# Patient Record
Sex: FEMALE | Race: WHITE
[De-identification: names, ages, dates, MRNs, and addresses within clinical notes are randomized per-mention and may not be internally consistent; named-entity substitution may affect disease eponyms.]

---

## 2017-03-04 PROBLEM — Z00.00 ENCOUNTER FOR PREVENTIVE HEALTH EXAMINATION: Status: ACTIVE | Noted: 2017-03-04

## 2017-03-27 ENCOUNTER — APPOINTMENT (OUTPATIENT)
Dept: UROLOGY | Facility: CLINIC | Age: 57
End: 2017-03-27

## 2018-02-01 ENCOUNTER — APPOINTMENT (OUTPATIENT)
Dept: PEDIATRIC ORTHOPEDIC SURGERY | Facility: CLINIC | Age: 58
End: 2018-02-01

## 2018-03-26 ENCOUNTER — APPOINTMENT (OUTPATIENT)
Dept: UROLOGY | Facility: CLINIC | Age: 58
End: 2018-03-26

## 2019-07-08 ENCOUNTER — APPOINTMENT (OUTPATIENT)
Dept: UROLOGY | Facility: CLINIC | Age: 59
End: 2019-07-08

## 2021-09-17 ENCOUNTER — OUTPATIENT (OUTPATIENT)
Dept: OUTPATIENT SERVICES | Facility: HOSPITAL | Age: 61
LOS: 1 days | Discharge: HOME | End: 2021-09-17
Payer: COMMERCIAL

## 2021-09-17 DIAGNOSIS — Z12.31 ENCOUNTER FOR SCREENING MAMMOGRAM FOR MALIGNANT NEOPLASM OF BREAST: ICD-10-CM

## 2021-09-17 DIAGNOSIS — R92.2 INCONCLUSIVE MAMMOGRAM: ICD-10-CM

## 2021-09-17 PROCEDURE — 77067 SCR MAMMO BI INCL CAD: CPT | Mod: 26

## 2021-09-17 PROCEDURE — 76641 ULTRASOUND BREAST COMPLETE: CPT | Mod: 26,50

## 2021-09-17 PROCEDURE — 77063 BREAST TOMOSYNTHESIS BI: CPT | Mod: 26

## 2021-10-20 ENCOUNTER — OUTPATIENT (OUTPATIENT)
Dept: OUTPATIENT SERVICES | Facility: HOSPITAL | Age: 61
LOS: 1 days | Discharge: HOME | End: 2021-10-20
Payer: COMMERCIAL

## 2021-10-20 DIAGNOSIS — M51.26 OTHER INTERVERTEBRAL DISC DISPLACEMENT, LUMBAR REGION: ICD-10-CM

## 2021-10-20 PROCEDURE — 72148 MRI LUMBAR SPINE W/O DYE: CPT | Mod: 26

## 2022-09-29 ENCOUNTER — OUTPATIENT (OUTPATIENT)
Dept: OUTPATIENT SERVICES | Facility: HOSPITAL | Age: 62
LOS: 1 days | Discharge: HOME | End: 2022-09-29

## 2022-09-29 DIAGNOSIS — R92.2 INCONCLUSIVE MAMMOGRAM: ICD-10-CM

## 2022-09-29 DIAGNOSIS — Z80.3 FAMILY HISTORY OF MALIGNANT NEOPLASM OF BREAST: ICD-10-CM

## 2022-09-29 DIAGNOSIS — Z12.31 ENCOUNTER FOR SCREENING MAMMOGRAM FOR MALIGNANT NEOPLASM OF BREAST: ICD-10-CM

## 2022-09-29 PROCEDURE — 77063 BREAST TOMOSYNTHESIS BI: CPT | Mod: 26

## 2022-09-29 PROCEDURE — 77067 SCR MAMMO BI INCL CAD: CPT | Mod: 26

## 2022-09-29 PROCEDURE — 76641 ULTRASOUND BREAST COMPLETE: CPT | Mod: 26,50

## 2022-10-04 ENCOUNTER — OUTPATIENT (OUTPATIENT)
Dept: OUTPATIENT SERVICES | Facility: HOSPITAL | Age: 62
LOS: 1 days | Discharge: HOME | End: 2022-10-04

## 2022-10-04 DIAGNOSIS — R92.8 OTHER ABNORMAL AND INCONCLUSIVE FINDINGS ON DIAGNOSTIC IMAGING OF BREAST: ICD-10-CM

## 2022-10-04 PROCEDURE — 76642 ULTRASOUND BREAST LIMITED: CPT | Mod: 26,LT

## 2022-10-07 ENCOUNTER — OUTPATIENT (OUTPATIENT)
Dept: OUTPATIENT SERVICES | Facility: HOSPITAL | Age: 62
LOS: 1 days | Discharge: HOME | End: 2022-10-07

## 2022-10-07 ENCOUNTER — RESULT REVIEW (OUTPATIENT)
Age: 62
End: 2022-10-07

## 2022-10-07 PROCEDURE — 19083 BX BREAST 1ST LESION US IMAG: CPT | Mod: LT

## 2022-10-07 PROCEDURE — 88305 TISSUE EXAM BY PATHOLOGIST: CPT | Mod: 26

## 2022-10-07 PROCEDURE — 77065 DX MAMMO INCL CAD UNI: CPT | Mod: 26,LT

## 2022-10-10 ENCOUNTER — TRANSCRIPTION ENCOUNTER (OUTPATIENT)
Age: 62
End: 2022-10-10

## 2022-10-10 LAB — SURGICAL PATHOLOGY STUDY: SIGNIFICANT CHANGE UP

## 2022-10-12 DIAGNOSIS — N60.12 DIFFUSE CYSTIC MASTOPATHY OF LEFT BREAST: ICD-10-CM

## 2022-10-12 DIAGNOSIS — N63.20 UNSPECIFIED LUMP IN THE LEFT BREAST, UNSPECIFIED QUADRANT: ICD-10-CM

## 2022-10-12 DIAGNOSIS — N60.22 FIBROADENOSIS OF LEFT BREAST: ICD-10-CM

## 2022-10-18 ENCOUNTER — APPOINTMENT (OUTPATIENT)
Dept: SURGERY | Facility: CLINIC | Age: 62
End: 2022-10-18

## 2022-10-18 ENCOUNTER — NON-APPOINTMENT (OUTPATIENT)
Age: 62
End: 2022-10-18

## 2022-10-18 VITALS
BODY MASS INDEX: 21.71 KG/M2 | WEIGHT: 115 LBS | OXYGEN SATURATION: 99 % | SYSTOLIC BLOOD PRESSURE: 156 MMHG | HEIGHT: 61 IN | DIASTOLIC BLOOD PRESSURE: 86 MMHG | HEART RATE: 97 BPM

## 2022-10-18 DIAGNOSIS — Z80.3 FAMILY HISTORY OF MALIGNANT NEOPLASM OF BREAST: ICD-10-CM

## 2022-10-18 DIAGNOSIS — Z84.89 FAMILY HISTORY OF OTHER SPECIFIED CONDITIONS: ICD-10-CM

## 2022-10-18 PROCEDURE — 99204 OFFICE O/P NEW MOD 45 MIN: CPT

## 2022-10-18 NOTE — OB HISTORY
[Postmenopausal] : The patient is postmenopausal [Menarche Age ____] : menarche age [unfilled] [Menopause Age____] : age at menopause was [unfilled] [Total Preg ___] : G[unfilled] [Live Births ___] : P[unfilled]

## 2022-10-20 ENCOUNTER — NON-APPOINTMENT (OUTPATIENT)
Age: 62
End: 2022-10-20

## 2022-10-23 NOTE — HISTORY OF PRESENT ILLNESS
[de-identified] : SINTIA MCKEON  is a pleasant 62 year year old woman  who came in 10/18/2022 for radial scar on mammo.\par \par \par menarche age 15\par LMP age 55 \par 2 pregnancies, 2 living children\par no ocp or other hormone use\par \par 10/2022 US : Irregular, antiparallel, hypoechoic mass in the left breast, 2-3 o'clock axis, 5 to 6 cm from the nipple measures 0.3 x 0.4 x 0.7 cm\par BX SHOWED\par -Focal mildly atypical apocrine adenosis arising in the background of a radial sclerosing lesion (radial scar).\par -Proliferative type fibrocystic changes associated with microcalcifications.

## 2022-10-23 NOTE — ASSESSMENT
[FreeTextEntry1] : SINTIA MCKEON  is a pleasant 62 year year old woman  who came in 10/18/2022 for radial scar on mammo.\par \par \par menarche age 15\par LMP age 55 \par 2 pregnancies, 2 living children\par no ocp or other hormone use\par \par 10/2022 US : Irregular, antiparallel, hypoechoic mass in the left breast, 2-3 o'clock axis, 5 to 6 cm from the nipple measures 0.3 x 0.4 x 0.7 cm\par BX SHOWED\par -Focal mildly atypical apocrine adenosis arising in the background of a radial sclerosing lesion (radial scar).\par -Proliferative type fibrocystic changes associated with microcalcifications.\par \par 10/18/2022: We discussed the need for radiofrequency localization and excisional biopsy of the radial scar lesion.  Possible complication were discussed with the patient and possible need for further surgical resection after final pathology for this excisional biopsy

## 2022-10-23 NOTE — REASON FOR VISIT
[Initial Consultation] : an initial consultation for [Breast Mass] : breast mass [Spouse] : spouse [FreeTextEntry2] : left breast mass

## 2022-10-25 ENCOUNTER — OUTPATIENT (OUTPATIENT)
Dept: OUTPATIENT SERVICES | Facility: HOSPITAL | Age: 62
LOS: 1 days | Discharge: HOME | End: 2022-10-25

## 2022-10-25 VITALS
DIASTOLIC BLOOD PRESSURE: 79 MMHG | TEMPERATURE: 98 F | OXYGEN SATURATION: 99 % | HEART RATE: 97 BPM | HEIGHT: 61 IN | RESPIRATION RATE: 18 BRPM | WEIGHT: 114.2 LBS | SYSTOLIC BLOOD PRESSURE: 151 MMHG

## 2022-10-25 DIAGNOSIS — Z01.818 ENCOUNTER FOR OTHER PREPROCEDURAL EXAMINATION: ICD-10-CM

## 2022-10-25 DIAGNOSIS — N60.89 OTHER BENIGN MAMMARY DYSPLASIAS OF UNSPECIFIED BREAST: ICD-10-CM

## 2022-10-25 DIAGNOSIS — Z98.890 OTHER SPECIFIED POSTPROCEDURAL STATES: Chronic | ICD-10-CM

## 2022-10-25 DIAGNOSIS — Z98.891 HISTORY OF UTERINE SCAR FROM PREVIOUS SURGERY: Chronic | ICD-10-CM

## 2022-10-25 LAB
ALBUMIN SERPL ELPH-MCNC: 4.5 G/DL — SIGNIFICANT CHANGE UP (ref 3.5–5.2)
ALP SERPL-CCNC: 61 U/L — SIGNIFICANT CHANGE UP (ref 30–115)
ALT FLD-CCNC: 13 U/L — SIGNIFICANT CHANGE UP (ref 0–41)
ANION GAP SERPL CALC-SCNC: 13 MMOL/L — SIGNIFICANT CHANGE UP (ref 7–14)
APTT BLD: 29.9 SEC — SIGNIFICANT CHANGE UP (ref 27–39.2)
AST SERPL-CCNC: 22 U/L — SIGNIFICANT CHANGE UP (ref 0–41)
BASOPHILS # BLD AUTO: 0.04 K/UL — SIGNIFICANT CHANGE UP (ref 0–0.2)
BASOPHILS NFR BLD AUTO: 0.6 % — SIGNIFICANT CHANGE UP (ref 0–1)
BILIRUB SERPL-MCNC: 0.4 MG/DL — SIGNIFICANT CHANGE UP (ref 0.2–1.2)
BUN SERPL-MCNC: 17 MG/DL — SIGNIFICANT CHANGE UP (ref 10–20)
CALCIUM SERPL-MCNC: 9.3 MG/DL — SIGNIFICANT CHANGE UP (ref 8.4–10.5)
CHLORIDE SERPL-SCNC: 98 MMOL/L — SIGNIFICANT CHANGE UP (ref 98–110)
CO2 SERPL-SCNC: 26 MMOL/L — SIGNIFICANT CHANGE UP (ref 17–32)
CREAT SERPL-MCNC: 0.8 MG/DL — SIGNIFICANT CHANGE UP (ref 0.7–1.5)
EGFR: 84 ML/MIN/1.73M2 — SIGNIFICANT CHANGE UP
EOSINOPHIL # BLD AUTO: 0.04 K/UL — SIGNIFICANT CHANGE UP (ref 0–0.7)
EOSINOPHIL NFR BLD AUTO: 0.6 % — SIGNIFICANT CHANGE UP (ref 0–8)
GLUCOSE SERPL-MCNC: 80 MG/DL — SIGNIFICANT CHANGE UP (ref 70–99)
HCT VFR BLD CALC: 44.2 % — SIGNIFICANT CHANGE UP (ref 37–47)
HGB BLD-MCNC: 14.2 G/DL — SIGNIFICANT CHANGE UP (ref 12–16)
IMM GRANULOCYTES NFR BLD AUTO: 0.4 % — HIGH (ref 0.1–0.3)
INR BLD: 1.02 RATIO — SIGNIFICANT CHANGE UP (ref 0.65–1.3)
LYMPHOCYTES # BLD AUTO: 1.26 K/UL — SIGNIFICANT CHANGE UP (ref 1.2–3.4)
LYMPHOCYTES # BLD AUTO: 18 % — LOW (ref 20.5–51.1)
MCHC RBC-ENTMCNC: 27.9 PG — SIGNIFICANT CHANGE UP (ref 27–31)
MCHC RBC-ENTMCNC: 32.1 G/DL — SIGNIFICANT CHANGE UP (ref 32–37)
MCV RBC AUTO: 86.8 FL — SIGNIFICANT CHANGE UP (ref 81–99)
MONOCYTES # BLD AUTO: 0.54 K/UL — SIGNIFICANT CHANGE UP (ref 0.1–0.6)
MONOCYTES NFR BLD AUTO: 7.7 % — SIGNIFICANT CHANGE UP (ref 1.7–9.3)
NEUTROPHILS # BLD AUTO: 5.08 K/UL — SIGNIFICANT CHANGE UP (ref 1.4–6.5)
NEUTROPHILS NFR BLD AUTO: 72.7 % — SIGNIFICANT CHANGE UP (ref 42.2–75.2)
NRBC # BLD: 0 /100 WBCS — SIGNIFICANT CHANGE UP (ref 0–0)
PLATELET # BLD AUTO: 212 K/UL — SIGNIFICANT CHANGE UP (ref 130–400)
POTASSIUM SERPL-MCNC: 4.1 MMOL/L — SIGNIFICANT CHANGE UP (ref 3.5–5)
POTASSIUM SERPL-SCNC: 4.1 MMOL/L — SIGNIFICANT CHANGE UP (ref 3.5–5)
PROT SERPL-MCNC: 7 G/DL — SIGNIFICANT CHANGE UP (ref 6–8)
PROTHROM AB SERPL-ACNC: 11.6 SEC — SIGNIFICANT CHANGE UP (ref 9.95–12.87)
RBC # BLD: 5.09 M/UL — SIGNIFICANT CHANGE UP (ref 4.2–5.4)
RBC # FLD: 13.5 % — SIGNIFICANT CHANGE UP (ref 11.5–14.5)
SODIUM SERPL-SCNC: 137 MMOL/L — SIGNIFICANT CHANGE UP (ref 135–146)
WBC # BLD: 6.99 K/UL — SIGNIFICANT CHANGE UP (ref 4.8–10.8)
WBC # FLD AUTO: 6.99 K/UL — SIGNIFICANT CHANGE UP (ref 4.8–10.8)

## 2022-10-25 PROCEDURE — 93010 ELECTROCARDIOGRAM REPORT: CPT

## 2022-10-25 NOTE — H&P PST ADULT - HISTORY OF PRESENT ILLNESS
CURRENTLY  DENIES ANY CP, SOB, PALPITATIONS, COUGH OR DYSURIA  EXERCISE TOLERANCE 3-4 FOS WITHOUT SOB    AS PER PATIENT  this is his/her complete medical history including medications - PRESCRIPTIONS  OVER THE COUNTER MEDS    pt denies any covid s/s, or tested positive in the past.  Received covid vaccine X 3 DOSES  pt advised self quarantine till day of procedure    Anesthesia Alert  NO--Difficult Airway  NO--History of neck surgery or radiation  NO--Limited ROM of neck  NO--History of Malignant hyperthermia  NO--No personal or family history of Pseudocholinesterase deficiency.  NO--Prior Anesthesia Complication  NO--Latex Allergy  NO--Loose teeth  NO--History of Rheumatoid Arthritis  NO--GABRIEL  NO--Bleeding risk  NO--Other_____    Patient verbalized understanding of instructions and was given the opportunity to ask questions and have them answered.

## 2022-10-25 NOTE — H&P PST ADULT - REASON FOR ADMISSION
61 Y/O F SCHEDULED FOR PAST FOR  LEFT BREAST LUMPECTOMY WITH RADIOFREQUENCY CLIP GUIDED LOCALIZATION ON 11/9/22 WITH DR LOPEZ UNDER [facility]. PT WITH LEFT BREAST RADIAL SCAR DIAGNOSED VIA BIOPSY.

## 2022-10-25 NOTE — H&P PST ADULT - NSICDXFAMILYHX_GEN_ALL_CORE_FT
FAMILY HISTORY:  Father  Still living? No  FH: heart disease, Age at diagnosis: Age Unknown    Sibling  Still living? Yes, Estimated age: Age Unknown  FH: breast cancer, Age at diagnosis: Age Unknown

## 2022-11-01 ENCOUNTER — LABORATORY RESULT (OUTPATIENT)
Age: 62
End: 2022-11-01

## 2022-11-01 ENCOUNTER — APPOINTMENT (OUTPATIENT)
Dept: BREAST CENTER | Facility: CLINIC | Age: 62
End: 2022-11-01

## 2022-11-03 ENCOUNTER — RESULT REVIEW (OUTPATIENT)
Age: 62
End: 2022-11-03

## 2022-11-03 ENCOUNTER — OUTPATIENT (OUTPATIENT)
Dept: OUTPATIENT SERVICES | Facility: HOSPITAL | Age: 62
LOS: 1 days | Discharge: HOME | End: 2022-11-03

## 2022-11-03 DIAGNOSIS — Z98.891 HISTORY OF UTERINE SCAR FROM PREVIOUS SURGERY: Chronic | ICD-10-CM

## 2022-11-03 DIAGNOSIS — Z98.890 OTHER SPECIFIED POSTPROCEDURAL STATES: Chronic | ICD-10-CM

## 2022-11-03 PROBLEM — E78.00 PURE HYPERCHOLESTEROLEMIA, UNSPECIFIED: Chronic | Status: ACTIVE | Noted: 2022-10-25

## 2022-11-03 PROBLEM — M81.0 AGE-RELATED OSTEOPOROSIS WITHOUT CURRENT PATHOLOGICAL FRACTURE: Chronic | Status: ACTIVE | Noted: 2022-10-25

## 2022-11-03 PROCEDURE — 19281 PERQ DEVICE BREAST 1ST IMAG: CPT | Mod: LT

## 2022-11-03 NOTE — ASU PATIENT PROFILE, ADULT - FALL HARM RISK - UNIVERSAL INTERVENTIONS
Bed in lowest position, wheels locked, appropriate side rails in place/Call bell, personal items and telephone in reach/Instruct patient to call for assistance before getting out of bed or chair/Non-slip footwear when patient is out of bed/[place] to call system/Physically safe environment - no spills, clutter or unnecessary equipment/Purposeful Proactive Rounding/Room/bathroom lighting operational, light cord in reach

## 2022-11-04 ENCOUNTER — APPOINTMENT (OUTPATIENT)
Dept: SURGERY | Facility: AMBULATORY SURGERY CENTER | Age: 62
End: 2022-11-04

## 2022-11-04 ENCOUNTER — TRANSCRIPTION ENCOUNTER (OUTPATIENT)
Age: 62
End: 2022-11-04

## 2022-11-04 ENCOUNTER — RESULT REVIEW (OUTPATIENT)
Age: 62
End: 2022-11-04

## 2022-11-04 ENCOUNTER — OUTPATIENT (OUTPATIENT)
Dept: OUTPATIENT SERVICES | Facility: HOSPITAL | Age: 62
LOS: 1 days | Discharge: HOME | End: 2022-11-04

## 2022-11-04 VITALS
TEMPERATURE: 98 F | OXYGEN SATURATION: 99 % | DIASTOLIC BLOOD PRESSURE: 90 MMHG | WEIGHT: 114.2 LBS | HEART RATE: 97 BPM | RESPIRATION RATE: 18 BRPM | SYSTOLIC BLOOD PRESSURE: 163 MMHG | HEIGHT: 61 IN

## 2022-11-04 VITALS
DIASTOLIC BLOOD PRESSURE: 78 MMHG | OXYGEN SATURATION: 98 % | SYSTOLIC BLOOD PRESSURE: 140 MMHG | RESPIRATION RATE: 16 BRPM | HEART RATE: 82 BPM

## 2022-11-04 DIAGNOSIS — Z98.891 HISTORY OF UTERINE SCAR FROM PREVIOUS SURGERY: Chronic | ICD-10-CM

## 2022-11-04 DIAGNOSIS — Z98.890 OTHER SPECIFIED POSTPROCEDURAL STATES: Chronic | ICD-10-CM

## 2022-11-04 PROCEDURE — 88305 TISSUE EXAM BY PATHOLOGIST: CPT | Mod: 26

## 2022-11-04 PROCEDURE — 19301 PARTIAL MASTECTOMY: CPT

## 2022-11-04 PROCEDURE — 14000 TIS TRNFR TRUNK 10 SQ CM/<: CPT

## 2022-11-04 RX ORDER — SODIUM CHLORIDE 9 MG/ML
1000 INJECTION, SOLUTION INTRAVENOUS
Refills: 0 | Status: DISCONTINUED | OUTPATIENT
Start: 2022-11-04 | End: 2022-11-18

## 2022-11-04 RX ORDER — ONDANSETRON 8 MG/1
4 TABLET, FILM COATED ORAL ONCE
Refills: 0 | Status: COMPLETED | OUTPATIENT
Start: 2022-11-04 | End: 2022-11-04

## 2022-11-04 RX ORDER — HYDROMORPHONE HYDROCHLORIDE 2 MG/ML
0.5 INJECTION INTRAMUSCULAR; INTRAVENOUS; SUBCUTANEOUS
Refills: 0 | Status: DISCONTINUED | OUTPATIENT
Start: 2022-11-04 | End: 2022-11-04

## 2022-11-04 RX ADMIN — ONDANSETRON 4 MILLIGRAM(S): 8 TABLET, FILM COATED ORAL at 10:07

## 2022-11-04 RX ADMIN — SODIUM CHLORIDE 100 MILLILITER(S): 9 INJECTION, SOLUTION INTRAVENOUS at 09:14

## 2022-11-04 NOTE — ASU DISCHARGE PLAN (ADULT/PEDIATRIC) - NS MD DC FALL RISK RISK
For information on Fall & Injury Prevention, visit: [email or web address] OR  [email or web address] OR  https://www.cdc.gov/steadi/patient.html

## 2022-11-04 NOTE — ASU DISCHARGE PLAN (ADULT/PEDIATRIC) - CARE PROVIDER_API CALL
Jerry Carmichael (MD)  Complex General Surgical Oncology; Surgery  [address], [address] [place] [address]  Phone: [phone]  Fax: [phone]  Follow Up Time: 2 weeks

## 2022-11-04 NOTE — BRIEF OPERATIVE NOTE - OPERATION/FINDINGS
Left breast mass.  RF clip and marker visualized in sample on radiograph.    1 specimen removed marked left breast mass, short suture for superior, long suture for lateral, and double suture for deep. Left breast mass.  RF clip and marker visualized in sample on radiograph.  Radiofrequency Clip Localizer number: [id number]    1 specimen removed marked left breast mass, short suture for superior, long suture for lateral, and double suture for deep.

## 2022-11-04 NOTE — BRIEF OPERATIVE NOTE - NSICDXBRIEFPROCEDURE_GEN_ALL_CORE_FT
PROCEDURES:  Lumpectomy of breast with localization using radiofrequency identification 04-Nov-2022 09:09:36 Left Breast Mark Koenig

## 2022-11-04 NOTE — CHART NOTE - NSCHARTNOTEFT_GEN_A_CORE
PACU ANESTHESIA ADMISSION NOTE      Procedure: Lumpectomy of breast with localization using radiofrequency identification  Left Breast      Post op diagnosis:  Left breast mass        ____  Intubated  TV:______       Rate: ______      FiO2: ______    _x___  Patent Airway    _x___  Full return of protective reflexes    _x___  Full recovery from anesthesia / back to baseline status    Vitals:  T 97.7 f  HR: 94  BP: 147/67  RR: 14  SpO2: 98% on RA    Mental Status:  _x___ Awake   _x____ Alert   _____ Drowsy   _____ Sedated    Nausea/Vomiting:  _x___  NO       ______Yes,   See Post - Op Orders         Pain Scale (0-10):  __0___    Treatment: _x___ None    ____ See Post - Op/PCA Orders    Post - Operative Fluids:   __x__ Oral   __x__ See Post - Op Orders    Plan: Discharge:   _x___Home       _____Floor     _____Critical Care    _____  Other:_________________    Comments: difficult LMA placement, otherwise uneventful LMA general anesthetic, VSS, full report to pacu rn  No anesthesia issues or complications noted.  Discharge when criteria met.

## 2022-11-04 NOTE — ASU DISCHARGE PLAN (ADULT/PEDIATRIC) - ASU DC SPECIAL INSTRUCTIONSFT
Follow Up with Dr. Carmichael in 1-2 weeks. Please call [phone] for confirmation of your appointment.    Diet: Regular diet    Pain: You can take over the counter medications such as Tylenol, and Ibuprofen for pain control. Please adhere to the instructions on the back of the bottle. Please wear the support bra as much as possible until you see Dr. Carmichael in the office.     Activity: Please avoid any trauma to the left breast. Please avoid any heavy lifting with the left arm for the next several weeks.     If you develop fevers, chills, worsening pain, increased drainage from the wound, foul smelling drainage from the wound, nausea that won't subside, vomiting, or any other symptoms of concern, please call MD for further advice, evaluation, and/or treatment.    Activity: Ambulate and get out of bed as tolerated, and with assistance if feeling weak.

## 2022-11-08 LAB — SURGICAL PATHOLOGY STUDY: SIGNIFICANT CHANGE UP

## 2022-11-09 DIAGNOSIS — Z79.899 OTHER LONG TERM (CURRENT) DRUG THERAPY: ICD-10-CM

## 2022-11-09 DIAGNOSIS — L90.5 SCAR CONDITIONS AND FIBROSIS OF SKIN: ICD-10-CM

## 2022-11-09 DIAGNOSIS — M81.0 AGE-RELATED OSTEOPOROSIS WITHOUT CURRENT PATHOLOGICAL FRACTURE: ICD-10-CM

## 2022-11-09 DIAGNOSIS — Z87.891 PERSONAL HISTORY OF NICOTINE DEPENDENCE: ICD-10-CM

## 2022-11-09 DIAGNOSIS — N60.32 FIBROSCLEROSIS OF LEFT BREAST: ICD-10-CM

## 2022-11-09 DIAGNOSIS — N60.82 OTHER BENIGN MAMMARY DYSPLASIAS OF LEFT BREAST: ICD-10-CM

## 2022-11-09 DIAGNOSIS — D24.2 BENIGN NEOPLASM OF LEFT BREAST: ICD-10-CM

## 2022-11-09 DIAGNOSIS — Z79.82 LONG TERM (CURRENT) USE OF ASPIRIN: ICD-10-CM

## 2022-11-09 DIAGNOSIS — E78.00 PURE HYPERCHOLESTEROLEMIA, UNSPECIFIED: ICD-10-CM

## 2022-11-09 DIAGNOSIS — Z45.89 ENCOUNTER FOR ADJUSTMENT AND MANAGEMENT OF OTHER IMPLANTED DEVICES: ICD-10-CM

## 2022-11-09 DIAGNOSIS — Z88.2 ALLERGY STATUS TO SULFONAMIDES: ICD-10-CM

## 2022-11-09 DIAGNOSIS — Z80.3 FAMILY HISTORY OF MALIGNANT NEOPLASM OF BREAST: ICD-10-CM

## 2022-11-09 DIAGNOSIS — N60.22 FIBROADENOSIS OF LEFT BREAST: ICD-10-CM

## 2022-11-17 ENCOUNTER — APPOINTMENT (OUTPATIENT)
Dept: SURGERY | Facility: CLINIC | Age: 62
End: 2022-11-17

## 2022-11-17 VITALS
SYSTOLIC BLOOD PRESSURE: 160 MMHG | HEIGHT: 61 IN | OXYGEN SATURATION: 99 % | WEIGHT: 115 LBS | DIASTOLIC BLOOD PRESSURE: 100 MMHG | HEART RATE: 102 BPM | BODY MASS INDEX: 21.71 KG/M2 | TEMPERATURE: 97.2 F

## 2022-11-17 DIAGNOSIS — N64.89 OTHER SPECIFIED DISORDERS OF BREAST: ICD-10-CM

## 2022-11-17 PROCEDURE — 99024 POSTOP FOLLOW-UP VISIT: CPT

## 2022-11-18 NOTE — HISTORY OF PRESENT ILLNESS
The patient is a 35y Female complaining of see chief complaint quote. [de-identified] : SINTIA MCKEON  is a pleasant 62 year year old woman  who came in 10/18/2022 for radial scar on mammo.\par \par \par menarche age 15\par LMP age 55 \par 2 pregnancies, 2 living children\par no ocp or other hormone use\par \par 10/2022 US : Irregular, antiparallel, hypoechoic mass in the left breast, 2-3 o'clock axis, 5 to 6 cm from the nipple measures 0.3 x 0.4 x 0.7 cm\par BX SHOWED\par -Focal mildly atypical apocrine adenosis arising in the background of a radial sclerosing lesion (radial scar).\par -Proliferative type fibrocystic changes associated with microcalcifications.\par \par 11/17/2022: came to discuss path, and order 6 months mammo and address her umbilical hernia

## 2022-11-18 NOTE — REASON FOR VISIT
[Post Op: _________] : a [unfilled] post op visit [Initial Consultation] : an initial consultation for [Breast Mass] : breast mass [Spouse] : spouse [FreeTextEntry1] : 11/4/22 left radiofrequency localized lumpectomy [FreeTextEntry2] : left breast mass

## 2022-11-18 NOTE — HISTORY OF PRESENT ILLNESS
[de-identified] : SINTIA MCKEON  is a pleasant 62 year year old woman  who came in 10/18/2022 for radial scar on mammo.\par \par \par menarche age 15\par LMP age 55 \par 2 pregnancies, 2 living children\par no ocp or other hormone use\par \par 10/2022 US : Irregular, antiparallel, hypoechoic mass in the left breast, 2-3 o'clock axis, 5 to 6 cm from the nipple measures 0.3 x 0.4 x 0.7 cm\par BX SHOWED\par -Focal mildly atypical apocrine adenosis arising in the background of a radial sclerosing lesion (radial scar).\par -Proliferative type fibrocystic changes associated with microcalcifications.\par \par 11/17/2022: came to discuss path, and order 6 months mammo and address her umbilical hernia

## 2022-11-27 PROBLEM — N64.89 RADIAL SCAR OF LEFT BREAST: Status: ACTIVE | Noted: 2022-10-20

## 2023-01-12 ENCOUNTER — APPOINTMENT (OUTPATIENT)
Dept: SURGERY | Facility: CLINIC | Age: 63
End: 2023-01-12
Payer: COMMERCIAL

## 2023-01-12 VITALS — WEIGHT: 112 LBS | BODY MASS INDEX: 21.14 KG/M2 | HEIGHT: 61 IN

## 2023-01-12 PROCEDURE — 99213 OFFICE O/P EST LOW 20 MIN: CPT

## 2023-01-12 NOTE — CONSULT LETTER
[Dear  ___] : Dear  [unfilled], [Courtesy Letter:] : I had the pleasure of seeing your patient, [unfilled], in my office today. [Please see my note below.] : Please see my note below. [Consult Closing:] : Thank you very much for allowing me to participate in the care of this patient.  If you have any questions, please do not hesitate to contact me. [FreeTextEntry3] : Respectfully,\par \par Atul Pendleton M.D., FACS\par  [DrZaki  ___] : Dr. LYNCH

## 2023-01-12 NOTE — ASSESSMENT
[FreeTextEntry1] : Kia is a pleasant 62-year-old  woman with a past medical history significant for hypercholesterolemia and osteoporosis along with a distant history of 2  sections in the past and a recent lumpectomy for a radial scar now presenting with concerns about swelling and intermittent discomfort in the periumbilical region suspicious for hernia.  She does admit to doing a reasonable amount of heavy lifting and strenuous activity around the house and she enjoys exercising.\par \par Physical examination demonstrates a strawberry sized tender bulge at the umbilicus which is not reducible consistent with an intermittently symptomatic incarcerated umbilical hernia warranting surgical repair.  There is no evidence of strangulation and the patient denies any symptoms of obstruction.  She has no significant diastasis recti despite 2 previous full-term pregnancies in the past.  Her current BMI is 21.\par \par I explained the pros and cons of surgery, as well as all risks, benefits, indications and alternatives of the procedure and the patient understood and agreed.  She will talk this over with her  and call back to schedule in the near future.  Kia is aware that the repair of her incarcerated umbilical hernia with mesh will be done under LOCAL with [facility] with presurgical testing waived.  She was encouraged to avoid heavy lifting and strenuous activity in the interim, of course.

## 2023-01-12 NOTE — PHYSICAL EXAM
[JVD] : no jugular venous distention  [Normal Breath Sounds] : Normal breath sounds [No Rash or Lesion] : No rash or lesion [Alert] : alert [Calm] : calm [de-identified] : Healthy [de-identified] : Normal [de-identified] : Soft and flat abdomen [de-identified] : Incarcerated umbilical hernia

## 2023-02-13 ENCOUNTER — LABORATORY RESULT (OUTPATIENT)
Age: 63
End: 2023-02-13

## 2023-02-15 NOTE — ASU PATIENT PROFILE, ADULT - FALL HARM RISK - UNIVERSAL INTERVENTIONS
Bed in lowest position, wheels locked, appropriate side rails in place/Call bell, personal items and telephone in reach/Instruct patient to call for assistance before getting out of bed or chair/Non-slip footwear when patient is out of bed/[place] to call system/Physically safe environment - no spills, clutter or unnecessary equipment/Purposeful Proactive Rounding/Room/bathroom lighting operational, light cord in reach

## 2023-02-16 ENCOUNTER — OUTPATIENT (OUTPATIENT)
Dept: INPATIENT UNIT | Facility: HOSPITAL | Age: 63
LOS: 1 days | Discharge: HOME | End: 2023-02-16
Payer: COMMERCIAL

## 2023-02-16 ENCOUNTER — TRANSCRIPTION ENCOUNTER (OUTPATIENT)
Age: 63
End: 2023-02-16

## 2023-02-16 ENCOUNTER — APPOINTMENT (OUTPATIENT)
Dept: SURGERY | Facility: AMBULATORY SURGERY CENTER | Age: 63
End: 2023-02-16
Payer: COMMERCIAL

## 2023-02-16 VITALS
TEMPERATURE: 98 F | DIASTOLIC BLOOD PRESSURE: 77 MMHG | OXYGEN SATURATION: 99 % | HEART RATE: 96 BPM | WEIGHT: 114.2 LBS | SYSTOLIC BLOOD PRESSURE: 138 MMHG | RESPIRATION RATE: 18 BRPM | HEIGHT: 61 IN

## 2023-02-16 VITALS
OXYGEN SATURATION: 99 % | SYSTOLIC BLOOD PRESSURE: 110 MMHG | HEART RATE: 64 BPM | DIASTOLIC BLOOD PRESSURE: 73 MMHG | TEMPERATURE: 98 F | RESPIRATION RATE: 24 BRPM

## 2023-02-16 DIAGNOSIS — Z98.891 HISTORY OF UTERINE SCAR FROM PREVIOUS SURGERY: Chronic | ICD-10-CM

## 2023-02-16 DIAGNOSIS — K42.0 UMBILICAL HERNIA WITH OBSTRUCTION, WITHOUT GANGRENE: ICD-10-CM

## 2023-02-16 DIAGNOSIS — Z98.890 OTHER SPECIFIED POSTPROCEDURAL STATES: Chronic | ICD-10-CM

## 2023-02-16 PROCEDURE — 49594 RPR AA HRN 1ST 3-10 NCR/STRN: CPT

## 2023-02-16 PROCEDURE — C1781: CPT

## 2023-02-16 RX ORDER — MORPHINE SULFATE 50 MG/1
2 CAPSULE, EXTENDED RELEASE ORAL
Refills: 0 | Status: DISCONTINUED | OUTPATIENT
Start: 2023-02-16 | End: 2023-02-16

## 2023-02-16 RX ORDER — CALCIUM CARBONATE 500(1250)
2 TABLET ORAL
Qty: 0 | Refills: 0 | DISCHARGE

## 2023-02-16 RX ORDER — RISEDRONATE SODIUM 25.8; 4.2 MG/1; MG/1
1 TABLET, FILM COATED ORAL
Qty: 0 | Refills: 0 | DISCHARGE

## 2023-02-16 RX ORDER — SODIUM CHLORIDE 9 MG/ML
1000 INJECTION, SOLUTION INTRAVENOUS
Refills: 0 | Status: DISCONTINUED | OUTPATIENT
Start: 2023-02-16 | End: 2023-02-16

## 2023-02-16 RX ORDER — ASPIRIN/CALCIUM CARB/MAGNESIUM 324 MG
1 TABLET ORAL
Qty: 0 | Refills: 0 | DISCHARGE

## 2023-02-16 RX ORDER — ERGOCALCIFEROL 1.25 MG/1
0 CAPSULE ORAL
Qty: 0 | Refills: 0 | DISCHARGE

## 2023-02-16 RX ORDER — ROSUVASTATIN CALCIUM 5 MG/1
1 TABLET ORAL
Qty: 0 | Refills: 0 | DISCHARGE

## 2023-02-16 RX ORDER — LANOLIN ALCOHOL/MO/W.PET/CERES
1 CREAM (GRAM) TOPICAL
Qty: 0 | Refills: 0 | DISCHARGE

## 2023-02-16 RX ORDER — HYDROMORPHONE HYDROCHLORIDE 2 MG/ML
0.5 INJECTION INTRAMUSCULAR; INTRAVENOUS; SUBCUTANEOUS
Refills: 0 | Status: DISCONTINUED | OUTPATIENT
Start: 2023-02-16 | End: 2023-02-16

## 2023-02-16 RX ORDER — ONDANSETRON 8 MG/1
4 TABLET, FILM COATED ORAL ONCE
Refills: 0 | Status: DISCONTINUED | OUTPATIENT
Start: 2023-02-16 | End: 2023-02-16

## 2023-02-16 RX ORDER — ACETAMINOPHEN 500 MG
650 TABLET ORAL ONCE
Refills: 0 | Status: DISCONTINUED | OUTPATIENT
Start: 2023-02-16 | End: 2023-02-16

## 2023-02-16 RX ORDER — TRAMADOL HYDROCHLORIDE 50 MG/1
1 TABLET ORAL
Qty: 20 | Refills: 0
Start: 2023-02-16 | End: 2023-02-19

## 2023-02-16 RX ADMIN — SODIUM CHLORIDE 100 MILLILITER(S): 9 INJECTION, SOLUTION INTRAVENOUS at 08:34

## 2023-02-16 NOTE — ASU DISCHARGE PLAN (ADULT/PEDIATRIC) - PATIENT EDUCATION MATERIALS PROVIED
Pain Management/Provider pre-printed instructions given/Pre-printed instructions given for other (specify)

## 2023-02-16 NOTE — ASU DISCHARGE PLAN (ADULT/PEDIATRIC) - NS MD DC FALL RISK RISK
For information on Fall & Injury Prevention, visit: [email or web address] OR  [email or web address] OR  https://www.cdc.gov/steadi/patient.html

## 2023-02-16 NOTE — BRIEF OPERATIVE NOTE - NSICDXBRIEFPROCEDURE_GEN_ALL_CORE_FT
PROCEDURES:  Repair of anterior abdominal hernia(s) (ie, epigastric, incisional, ventral, umbilical, Spigelian), 16-Feb-2023 07:21:17  Atul Pendleton

## 2023-02-16 NOTE — ASU DISCHARGE PLAN (ADULT/PEDIATRIC) - CARE PROVIDER_API CALL
Atul Pendleton)  Surgery  [address] [facility]. [address] [place] [id number]  Phone: [phone]  Fax: [phone]  Scheduled Appointment: 02/27/2023 09:00 AM

## 2023-02-16 NOTE — BRIEF OPERATIVE NOTE - ESTIMATED BLOOD LOSS
For viral upper respiratory infection, symptomatic care is all that is needed:   · Encourage fluids  · Tylenol or Motrin as needed for fever.    · Nasal saline sprays  · Honey for cough   · Avoid OTC cough/cold medications if under 4 yrs    · Return to clinic for the following:  · Fever over 101 for more than 3 days.  · If fever goes away for 24 hours, then returns over 101.   · If child has worsening cough, difficulty breathing, nasal flaring, chest retractions, etc.  · Persistence of symptoms for greater than 14 days without improvement      If no improvement in the next 3-5 days - then call - can discuss antibiotics at that time.   
5
Patient/Caregiver provided printed discharge information.

## 2023-02-22 DIAGNOSIS — K42.0 UMBILICAL HERNIA WITH OBSTRUCTION, WITHOUT GANGRENE: ICD-10-CM

## 2023-02-22 DIAGNOSIS — E78.00 PURE HYPERCHOLESTEROLEMIA, UNSPECIFIED: ICD-10-CM

## 2023-02-22 DIAGNOSIS — Z88.2 ALLERGY STATUS TO SULFONAMIDES: ICD-10-CM

## 2023-02-22 DIAGNOSIS — Z79.899 OTHER LONG TERM (CURRENT) DRUG THERAPY: ICD-10-CM

## 2023-02-22 DIAGNOSIS — M81.0 AGE-RELATED OSTEOPOROSIS WITHOUT CURRENT PATHOLOGICAL FRACTURE: ICD-10-CM

## 2023-02-27 ENCOUNTER — APPOINTMENT (OUTPATIENT)
Dept: SURGERY | Facility: CLINIC | Age: 63
End: 2023-02-27
Payer: COMMERCIAL

## 2023-02-27 DIAGNOSIS — K42.0 UMBILICAL HERNIA WITH OBSTRUCTION, W/OUT GANGRENE: ICD-10-CM

## 2023-02-27 PROCEDURE — 99213 OFFICE O/P EST LOW 20 MIN: CPT

## 2023-02-27 NOTE — PHYSICAL EXAM
[JVD] : no jugular venous distention  [Respiratory Effort] : normal respiratory effort [No Rash or Lesion] : No rash or lesion [Alert] : alert [Calm] : calm [de-identified] : healthy [de-identified] : normal [de-identified] : soft and flat [de-identified] : incision C/D/I, no edema, erythema or drainage

## 2023-02-27 NOTE — CONSULT LETTER
[Dear  ___] : Dear  [unfilled], [Courtesy Letter:] : I had the pleasure of seeing your patient, [unfilled], in my office today. [Please see my note below.] : Please see my note below. [Consult Closing:] : Thank you very much for allowing me to participate in the care of this patient.  If you have any questions, please do not hesitate to contact me. [FreeTextEntry3] : Sincerely,\par \par Denisse Viera PA-C, BLUE\par  [DrZaki  ___] : Dr. LYNCH

## 2023-02-27 NOTE — ASSESSMENT
[FreeTextEntry1] : SINTIA MCKEON underwent an umbilical hernia repair with mesh with Dr. Pendleton on 2/16/23  under local IV sedation without any problems or complications. Her wound is clean, dry and intact. There is no evidence of erythema, seroma formation or infection. She is tolerating a diet and having normal bowel movements. She denies any significant postoperative pain or discomfort at this time.\par \par She was counseled and reassured. SINTIA was discharged from the office with no specific follow up necessary, but she knows to avoid any heavy lifting or strenuous activity for the next several weeks.\par She  was encouraged to continue to wear her abdominal binder for the better part of the next month.\par

## 2023-05-12 ENCOUNTER — RESULT REVIEW (OUTPATIENT)
Age: 63
End: 2023-05-12

## 2023-05-12 ENCOUNTER — OUTPATIENT (OUTPATIENT)
Dept: OUTPATIENT SERVICES | Facility: HOSPITAL | Age: 63
LOS: 1 days | End: 2023-05-12
Payer: COMMERCIAL

## 2023-05-12 DIAGNOSIS — Z98.891 HISTORY OF UTERINE SCAR FROM PREVIOUS SURGERY: Chronic | ICD-10-CM

## 2023-05-12 DIAGNOSIS — Z98.890 OTHER SPECIFIED POSTPROCEDURAL STATES: Chronic | ICD-10-CM

## 2023-05-12 DIAGNOSIS — Z00.8 ENCOUNTER FOR OTHER GENERAL EXAMINATION: ICD-10-CM

## 2023-05-12 DIAGNOSIS — R92.8 OTHER ABNORMAL AND INCONCLUSIVE FINDINGS ON DIAGNOSTIC IMAGING OF BREAST: ICD-10-CM

## 2023-05-12 PROCEDURE — G0279: CPT

## 2023-05-12 PROCEDURE — G0279: CPT | Mod: 26

## 2023-05-12 PROCEDURE — 77065 DX MAMMO INCL CAD UNI: CPT | Mod: 26,LT

## 2023-05-12 PROCEDURE — 76642 ULTRASOUND BREAST LIMITED: CPT | Mod: 26,LT

## 2023-05-12 PROCEDURE — 76642 ULTRASOUND BREAST LIMITED: CPT | Mod: LT

## 2023-05-12 PROCEDURE — 77065 DX MAMMO INCL CAD UNI: CPT | Mod: LT

## 2023-05-13 DIAGNOSIS — R92.8 OTHER ABNORMAL AND INCONCLUSIVE FINDINGS ON DIAGNOSTIC IMAGING OF BREAST: ICD-10-CM

## 2023-05-15 ENCOUNTER — APPOINTMENT (OUTPATIENT)
Dept: SURGERY | Facility: CLINIC | Age: 63
End: 2023-05-15

## 2023-05-16 ENCOUNTER — APPOINTMENT (OUTPATIENT)
Dept: SURGERY | Facility: CLINIC | Age: 63
End: 2023-05-16

## 2023-10-04 ENCOUNTER — OUTPATIENT (OUTPATIENT)
Dept: OUTPATIENT SERVICES | Facility: HOSPITAL | Age: 63
LOS: 1 days | End: 2023-10-04
Payer: COMMERCIAL

## 2023-10-04 DIAGNOSIS — Z98.890 OTHER SPECIFIED POSTPROCEDURAL STATES: Chronic | ICD-10-CM

## 2023-10-04 DIAGNOSIS — Z98.891 HISTORY OF UTERINE SCAR FROM PREVIOUS SURGERY: Chronic | ICD-10-CM

## 2023-10-04 DIAGNOSIS — R92.2 INCONCLUSIVE MAMMOGRAM: ICD-10-CM

## 2023-10-04 DIAGNOSIS — Z12.31 ENCOUNTER FOR SCREENING MAMMOGRAM FOR MALIGNANT NEOPLASM OF BREAST: ICD-10-CM

## 2023-10-04 PROCEDURE — 76641 ULTRASOUND BREAST COMPLETE: CPT | Mod: 26,50

## 2023-10-04 PROCEDURE — 76641 ULTRASOUND BREAST COMPLETE: CPT | Mod: 50

## 2023-10-04 PROCEDURE — 77067 SCR MAMMO BI INCL CAD: CPT | Mod: 26

## 2023-10-04 PROCEDURE — 77063 BREAST TOMOSYNTHESIS BI: CPT | Mod: 26

## 2023-10-04 PROCEDURE — 77067 SCR MAMMO BI INCL CAD: CPT

## 2023-10-04 PROCEDURE — 77063 BREAST TOMOSYNTHESIS BI: CPT

## 2023-10-05 DIAGNOSIS — R92.2 INCONCLUSIVE MAMMOGRAM: ICD-10-CM

## 2023-10-05 DIAGNOSIS — Z12.31 ENCOUNTER FOR SCREENING MAMMOGRAM FOR MALIGNANT NEOPLASM OF BREAST: ICD-10-CM

## 2024-06-22 ENCOUNTER — NON-APPOINTMENT (OUTPATIENT)
Age: 64
End: 2024-06-22

## 2024-06-23 ENCOUNTER — EMERGENCY (EMERGENCY)
Facility: HOSPITAL | Age: 64
LOS: 0 days | Discharge: HOME | End: 2024-06-24
Attending: STUDENT IN AN ORGANIZED HEALTH CARE EDUCATION/TRAINING PROGRAM
Payer: COMMERCIAL

## 2024-06-23 VITALS
OXYGEN SATURATION: 99 % | HEART RATE: 78 BPM | TEMPERATURE: 98 F | SYSTOLIC BLOOD PRESSURE: 143 MMHG | RESPIRATION RATE: 20 BRPM | DIASTOLIC BLOOD PRESSURE: 86 MMHG

## 2024-06-23 DIAGNOSIS — Z88.2 ALLERGY STATUS TO SULFONAMIDES: ICD-10-CM

## 2024-06-23 DIAGNOSIS — R53.1 WEAKNESS: ICD-10-CM

## 2024-06-23 DIAGNOSIS — Z98.891 HISTORY OF UTERINE SCAR FROM PREVIOUS SURGERY: Chronic | ICD-10-CM

## 2024-06-23 DIAGNOSIS — E87.6 HYPOKALEMIA: ICD-10-CM

## 2024-06-23 DIAGNOSIS — Z98.890 OTHER SPECIFIED POSTPROCEDURAL STATES: Chronic | ICD-10-CM

## 2024-06-23 DIAGNOSIS — R11.2 NAUSEA WITH VOMITING, UNSPECIFIED: ICD-10-CM

## 2024-06-23 LAB
ALBUMIN SERPL ELPH-MCNC: 4.3 G/DL — SIGNIFICANT CHANGE UP (ref 3.5–5.2)
ALP SERPL-CCNC: 49 U/L — SIGNIFICANT CHANGE UP (ref 30–115)
ALT FLD-CCNC: 12 U/L — SIGNIFICANT CHANGE UP (ref 0–41)
ANION GAP SERPL CALC-SCNC: 14 MMOL/L — SIGNIFICANT CHANGE UP (ref 7–14)
AST SERPL-CCNC: 23 U/L — SIGNIFICANT CHANGE UP (ref 0–41)
BASOPHILS # BLD AUTO: 0.02 K/UL — SIGNIFICANT CHANGE UP (ref 0–0.2)
BASOPHILS NFR BLD AUTO: 0.2 % — SIGNIFICANT CHANGE UP (ref 0–1)
BILIRUB SERPL-MCNC: 1 MG/DL — SIGNIFICANT CHANGE UP (ref 0.2–1.2)
BUN SERPL-MCNC: 9 MG/DL — LOW (ref 10–20)
CALCIUM SERPL-MCNC: 7.4 MG/DL — LOW (ref 8.4–10.5)
CHLORIDE SERPL-SCNC: 86 MMOL/L — LOW (ref 98–110)
CO2 SERPL-SCNC: 21 MMOL/L — SIGNIFICANT CHANGE UP (ref 17–32)
CREAT SERPL-MCNC: 0.6 MG/DL — LOW (ref 0.7–1.5)
EGFR: 101 ML/MIN/1.73M2 — SIGNIFICANT CHANGE UP
EOSINOPHIL # BLD AUTO: 0 K/UL — SIGNIFICANT CHANGE UP (ref 0–0.7)
EOSINOPHIL NFR BLD AUTO: 0 % — SIGNIFICANT CHANGE UP (ref 0–8)
GLUCOSE SERPL-MCNC: 153 MG/DL — HIGH (ref 70–99)
HCT VFR BLD CALC: 36 % — LOW (ref 37–47)
HGB BLD-MCNC: 12.2 G/DL — SIGNIFICANT CHANGE UP (ref 12–16)
IMM GRANULOCYTES NFR BLD AUTO: 0.5 % — HIGH (ref 0.1–0.3)
LACTATE SERPL-SCNC: 1.9 MMOL/L — SIGNIFICANT CHANGE UP (ref 0.7–2)
LIDOCAIN IGE QN: 20 U/L — SIGNIFICANT CHANGE UP (ref 7–60)
LYMPHOCYTES # BLD AUTO: 0.89 K/UL — LOW (ref 1.2–3.4)
LYMPHOCYTES # BLD AUTO: 6.9 % — LOW (ref 20.5–51.1)
MCHC RBC-ENTMCNC: 28.5 PG — SIGNIFICANT CHANGE UP (ref 27–31)
MCHC RBC-ENTMCNC: 33.9 G/DL — SIGNIFICANT CHANGE UP (ref 32–37)
MCV RBC AUTO: 84.1 FL — SIGNIFICANT CHANGE UP (ref 81–99)
MONOCYTES # BLD AUTO: 0.3 K/UL — SIGNIFICANT CHANGE UP (ref 0.1–0.6)
MONOCYTES NFR BLD AUTO: 2.3 % — SIGNIFICANT CHANGE UP (ref 1.7–9.3)
NEUTROPHILS # BLD AUTO: 11.66 K/UL — HIGH (ref 1.4–6.5)
NEUTROPHILS NFR BLD AUTO: 90.1 % — HIGH (ref 42.2–75.2)
NRBC # BLD: 0 /100 WBCS — SIGNIFICANT CHANGE UP (ref 0–0)
PLATELET # BLD AUTO: 183 K/UL — SIGNIFICANT CHANGE UP (ref 130–400)
PMV BLD: 10.5 FL — HIGH (ref 7.4–10.4)
POTASSIUM SERPL-MCNC: 3.1 MMOL/L — LOW (ref 3.5–5)
POTASSIUM SERPL-SCNC: 3.1 MMOL/L — LOW (ref 3.5–5)
PROT SERPL-MCNC: 6.5 G/DL — SIGNIFICANT CHANGE UP (ref 6–8)
RBC # BLD: 4.28 M/UL — SIGNIFICANT CHANGE UP (ref 4.2–5.4)
RBC # FLD: 13 % — SIGNIFICANT CHANGE UP (ref 11.5–14.5)
SODIUM SERPL-SCNC: 121 MMOL/L — LOW (ref 135–146)
WBC # BLD: 12.93 K/UL — HIGH (ref 4.8–10.8)
WBC # FLD AUTO: 12.93 K/UL — HIGH (ref 4.8–10.8)

## 2024-06-23 PROCEDURE — 36415 COLL VENOUS BLD VENIPUNCTURE: CPT

## 2024-06-23 PROCEDURE — 85025 COMPLETE CBC W/AUTO DIFF WBC: CPT

## 2024-06-23 PROCEDURE — 99285 EMERGENCY DEPT VISIT HI MDM: CPT

## 2024-06-23 PROCEDURE — 80053 COMPREHEN METABOLIC PANEL: CPT

## 2024-06-23 PROCEDURE — 82962 GLUCOSE BLOOD TEST: CPT

## 2024-06-23 PROCEDURE — 99284 EMERGENCY DEPT VISIT MOD MDM: CPT | Mod: 25

## 2024-06-23 PROCEDURE — 83605 ASSAY OF LACTIC ACID: CPT

## 2024-06-23 PROCEDURE — 83690 ASSAY OF LIPASE: CPT

## 2024-06-23 PROCEDURE — 96375 TX/PRO/DX INJ NEW DRUG ADDON: CPT

## 2024-06-23 PROCEDURE — 96374 THER/PROPH/DIAG INJ IV PUSH: CPT

## 2024-06-23 PROCEDURE — 93005 ELECTROCARDIOGRAM TRACING: CPT

## 2024-06-23 RX ORDER — SODIUM CHLORIDE 9 MG/ML
1000 INJECTION INTRAMUSCULAR; INTRAVENOUS; SUBCUTANEOUS ONCE
Refills: 0 | Status: COMPLETED | OUTPATIENT
Start: 2024-06-23 | End: 2024-06-23

## 2024-06-23 RX ORDER — ONDANSETRON 8 MG/1
4 TABLET, FILM COATED ORAL ONCE
Refills: 0 | Status: COMPLETED | OUTPATIENT
Start: 2024-06-23 | End: 2024-06-23

## 2024-06-23 RX ORDER — FAMOTIDINE 10 MG/ML
20 INJECTION INTRAVENOUS ONCE
Refills: 0 | Status: COMPLETED | OUTPATIENT
Start: 2024-06-23 | End: 2024-06-23

## 2024-06-23 RX ORDER — POTASSIUM CHLORIDE 20 MEQ
40 PACKET (EA) ORAL ONCE
Refills: 0 | Status: COMPLETED | OUTPATIENT
Start: 2024-06-23 | End: 2024-06-23

## 2024-06-23 RX ADMIN — ONDANSETRON 4 MILLIGRAM(S): 8 TABLET, FILM COATED ORAL at 22:42

## 2024-06-23 RX ADMIN — FAMOTIDINE 20 MILLIGRAM(S): 10 INJECTION INTRAVENOUS at 23:26

## 2024-06-23 RX ADMIN — SODIUM CHLORIDE 1000 MILLILITER(S): 9 INJECTION INTRAMUSCULAR; INTRAVENOUS; SUBCUTANEOUS at 22:43

## 2024-06-23 NOTE — ED ADULT TRIAGE NOTE - CHIEF COMPLAINT QUOTE
BIBA from home with nausea and vomiting . pt.  started  drinking the prep at around 3 pm  for colonoscopy scheduled tomorrow. pt. received Zofran via ems

## 2024-06-24 VITALS
OXYGEN SATURATION: 99 % | TEMPERATURE: 98 F | HEART RATE: 83 BPM | RESPIRATION RATE: 19 BRPM | DIASTOLIC BLOOD PRESSURE: 77 MMHG | SYSTOLIC BLOOD PRESSURE: 124 MMHG

## 2024-06-24 PROCEDURE — 93010 ELECTROCARDIOGRAM REPORT: CPT

## 2024-06-24 RX ORDER — POTASSIUM CHLORIDE 20 MEQ
20 PACKET (EA) ORAL ONCE
Refills: 0 | Status: COMPLETED | OUTPATIENT
Start: 2024-06-24 | End: 2024-06-24

## 2024-06-24 RX ORDER — MAGNESIUM SULFATE 500 MG/ML
2 VIAL (ML) INJECTION ONCE
Refills: 0 | Status: DISCONTINUED | OUTPATIENT
Start: 2024-06-24 | End: 2024-06-24

## 2024-06-24 RX ORDER — ONDANSETRON 8 MG/1
1 TABLET, FILM COATED ORAL
Qty: 9 | Refills: 0
Start: 2024-06-24 | End: 2024-06-26

## 2024-06-24 RX ORDER — POTASSIUM CHLORIDE 20 MEQ
40 PACKET (EA) ORAL ONCE
Refills: 0 | Status: COMPLETED | OUTPATIENT
Start: 2024-06-24 | End: 2024-06-24

## 2024-06-24 RX ADMIN — Medication 50 MILLIEQUIVALENT(S): at 01:05

## 2024-06-24 RX ADMIN — Medication 40 MILLIEQUIVALENT(S): at 04:21

## 2024-06-24 NOTE — ED PROVIDER NOTE - NSFOLLOWUPINSTRUCTIONS_ED_ALL_ED_FT
Nausea / Vomiting    Nausea is the feeling that you have to vomit. As nausea gets worse, it can lead to vomiting. Vomiting puts you at an increased risk for dehydration. Older adults and people with other diseases or a weak immune system are at higher risk for dehydration. Drink clear fluids in small but frequent amounts as tolerated. Eat bland, easy-to-digest foods in small amounts as tolerated.    SEEK IMMEDIATE MEDICAL CARE IF YOU HAVE ANY OF THE FOLLOWING SYMPTOMS: fever, inability to keep sufficient fluids down, black or bloody vomitus, black or bloody stools, lightheadedness/dizziness, chest pain, severe headache, rash, shortness of breath, cold or clammy skin, confusion, pain with urination, or any signs of dehydration.    Please follow up with your gastroenterologist within one week.

## 2024-06-24 NOTE — ED PROVIDER NOTE - OBJECTIVE STATEMENT
64-year-old female history of hyperlipidemia, umbilical hernia repair presenting to ER with nausea/vomiting after colonoscopy prep.  Patient states after taking colonoscopy prep at 3 PM started having multiple episodes of nonbloody nonbilious vomiting.  Positive generalized weakness.  No fever, chills, chest pain, shortness of breath, bloody stools, syncope.

## 2024-06-24 NOTE — ED PROVIDER NOTE - CLINICAL SUMMARY MEDICAL DECISION MAKING FREE TEXT BOX
64-year-old female Presents status post nausea vomiting after colonoscopy prep.  Labs IV fluids, ekg, pt noted to be hypokalemic, K repleted. pt reports improvement. Labs and EKG were ordered and reviewed.  Appropriate medications for patient's presenting complaints were ordered and effects were reassessed.  Patient's records (prior hospital, ED visit, and/or nursing home notes if available) were reviewed.  Additional history was obtained from EMS, family, and/or PCP (where available).  Escalation to admission/observation was considered. However patient feels much better and is comfortable with discharge.  Appropriate follow-up was arranged.     I have discussed the discharge plan with the patient. The patient agrees with the plan, as discussed.  The patient understands Emergency Department diagnosis is a preliminary diagnosis often based on limited information and that the patient must adhere to the follow-up plan as discussed.  The patient understands that if the symptoms worsen or if prescribed medications do not have the desired/planned effect that the patient may return to the Emergency Department at any time for further evaluation and treatment.

## 2024-06-24 NOTE — ED PROVIDER NOTE - PATIENT PORTAL LINK FT
You can access the FollowMyHealth Patient Portal offered by [facility] by registering at the following website: [email or web address]. By joining [facility]’s FollowMyHealth portal, you will also be able to view your health information using other applications (apps) compatible with our system.

## 2024-06-24 NOTE — ED PROVIDER NOTE - CARE PROVIDER_API CALL
Cecil Jones.  Gastroenterology  [address] [place] [id number]  Phone: [phone]  Fax: [phone]  Follow Up Time:

## 2024-06-24 NOTE — ED PROVIDER NOTE - ATTENDING APP SHARED VISIT CONTRIBUTION OF CARE
64-year-old female with a past medical history significant for hyperlipidemia umbilical hernia repair who presents with nausea vomiting.  Patient states that she was prepping for her colonoscopy tomorrow when she had multiple episodes of diarrhea [place] that were nonbloody however she also developed nonbilious nonbloody vomiting.  Patient states that she has been feeling weak however denies any abdominal pain chest pain or fevers bloody stools LOC shortness of breath or any other medical complaints.    VITAL SIGNS: I have reviewed nursing notes and confirm.  CONSTITUTIONAL: non-toxic, well appearing  SKIN: no rash, no petechiae.  EYES:  EOMI, pink conjunctiva, anicteric  ENT: tongue midline, no exudates, MMM  NECK: Supple; no meningismus, no JVD  CARD: RRR, no murmurs, equal radial pulses bilaterally 2+  RESP: CTAB, no respiratory distress  ABD: Soft, non-tender, non-distended, no peritoneal signs, no HSM, no CVA tenderness  EXT: Normal ROM x4. No edema. No calves tenderness  NEURO: Alert, oriented x3. CN2-12 intact, equal strength bilaterally, nl gait.  PSYCH: Cooperative, appropriate.    64-year-old female Presents status post nausea vomiting after colonoscopy prep.  Labs IV fluids, ekg, reassess. dispo pending.

## 2024-06-24 NOTE — ED PROVIDER NOTE - PROGRESS NOTE DETAILS
ER: pt reports improvement and requesting discharge. had a conversation with pt and offered admission due to dehydration. however, pt wishes to follow up outpatient with her pcp and gi doctor. had a shared decision making conversation with pt and spouse.

## 2024-10-09 ENCOUNTER — OUTPATIENT (OUTPATIENT)
Dept: OUTPATIENT SERVICES | Facility: HOSPITAL | Age: 64
LOS: 1 days | End: 2024-10-09
Payer: COMMERCIAL

## 2024-10-09 DIAGNOSIS — R92.2 INCONCLUSIVE MAMMOGRAM: ICD-10-CM

## 2024-10-09 DIAGNOSIS — Z98.891 HISTORY OF UTERINE SCAR FROM PREVIOUS SURGERY: Chronic | ICD-10-CM

## 2024-10-09 DIAGNOSIS — Z12.31 ENCOUNTER FOR SCREENING MAMMOGRAM FOR MALIGNANT NEOPLASM OF BREAST: ICD-10-CM

## 2024-10-09 DIAGNOSIS — Z98.890 OTHER SPECIFIED POSTPROCEDURAL STATES: Chronic | ICD-10-CM

## 2024-10-09 PROCEDURE — 76641 ULTRASOUND BREAST COMPLETE: CPT | Mod: 26,50

## 2024-10-09 PROCEDURE — 77063 BREAST TOMOSYNTHESIS BI: CPT

## 2024-10-09 PROCEDURE — 77067 SCR MAMMO BI INCL CAD: CPT

## 2024-10-09 PROCEDURE — 76641 ULTRASOUND BREAST COMPLETE: CPT | Mod: 50

## 2024-10-09 PROCEDURE — 77063 BREAST TOMOSYNTHESIS BI: CPT | Mod: 26

## 2024-10-09 PROCEDURE — 77067 SCR MAMMO BI INCL CAD: CPT | Mod: 26

## 2024-10-10 DIAGNOSIS — Z12.31 ENCOUNTER FOR SCREENING MAMMOGRAM FOR MALIGNANT NEOPLASM OF BREAST: ICD-10-CM

## 2024-10-10 DIAGNOSIS — R92.2 INCONCLUSIVE MAMMOGRAM: ICD-10-CM

## 2024-10-25 ENCOUNTER — OUTPATIENT (OUTPATIENT)
Dept: OUTPATIENT SERVICES | Facility: HOSPITAL | Age: 64
LOS: 1 days | End: 2024-10-25
Payer: COMMERCIAL

## 2024-10-25 DIAGNOSIS — Z80.3 FAMILY HISTORY OF MALIGNANT NEOPLASM OF BREAST: ICD-10-CM

## 2024-10-25 DIAGNOSIS — Z98.891 HISTORY OF UTERINE SCAR FROM PREVIOUS SURGERY: Chronic | ICD-10-CM

## 2024-10-25 DIAGNOSIS — N63.0 UNSPECIFIED LUMP IN UNSPECIFIED BREAST: ICD-10-CM

## 2024-10-25 DIAGNOSIS — Z98.890 OTHER SPECIFIED POSTPROCEDURAL STATES: Chronic | ICD-10-CM

## 2024-10-25 PROCEDURE — A9579: CPT

## 2024-10-25 PROCEDURE — 77049 MRI BREAST C-+ W/CAD BI: CPT | Mod: 26

## 2024-10-25 PROCEDURE — 77049 MRI BREAST C-+ W/CAD BI: CPT

## 2024-10-26 DIAGNOSIS — N63.0 UNSPECIFIED LUMP IN UNSPECIFIED BREAST: ICD-10-CM

## 2024-10-29 NOTE — ASU PREOP CHECKLIST - HAIR REMOVAL
Nursing Home Progress Note        Con Shook    [address] [place] [address] Phone: [phone]  Fax: [phone]     PATIENT NAME: Jaclyn Isaac                                                                          YOB: 1937           DATE OF SERVICE: 10/29/2024  FACILITY:   [facility]     CHIEF COMPLAINT:  Chronic Medical Management      History of Present Illness  Patient is a pleasant 86-year-old female with a history of dementia, anxiety, and hypothyroidism recently seen in the nursing facility for routine medical management.  Chronic issues have been very stable.  Patient did not seem to have any specific complaints or concerns.  Nurses report good compliance with taking medications and p.o. intake.  She has in fact gained about 5-8 pounds in the last month.       PAST MEDICAL & SURGICAL HISTORY:   Past Medical History:   Diagnosis Date   • Age related osteoporosis without current pathological fracture    • Age-related physical debility    • Anxiety and depression    • Aortic stenosis, mild    • Blind right eye    • Cataract    • Chronic idiopathic constipation    • Debility    • Dementia with behavioral disturbance 12/24/2022   • Disease of thyroid gland    • Fatty liver    • GERD (gastroesophageal reflux disease)    • Glaucoma    • Hepatic steatosis    • Hyperlipidemia    • Hypothyroidism    • Legal blindness, as defined in [place]    • Mood disorder    • Osteoporosis    • Slow transit constipation    • Unspecified dementia, unspecified severity, without behavioral disturbance, psychotic disturbance, mood disturbance, and anxiety    • Unspecified mood (affective) disorder       Past Surgical History:   Procedure Laterality Date   • APPENDECTOMY     • DILATATION AND CURETTAGE     • EYE SURGERY     • KYPHOPLASTY N/A 04/22/2020    Procedure: KYPHOPLASTY T-11;  Surgeon: Presley Álvarez MD;  Location: [facility];  Service: Neurosurgery;  Laterality: N/A;   •  TONSILLECTOMY           MEDICATIONS:  I have reviewed and reconciled the patients medication list in the patients chart at the skilled nursing facility today, 10/29/2024.      ALLERGIES:  Allergies   Allergen Reactions   • Sulfa Antibiotics Hives         SOCIAL HISTORY:  Social History     Socioeconomic History   • Marital status:    Tobacco Use   • Smoking status: Never   • Smokeless tobacco: Never   Vaping Use   • Vaping status: Never Used   Substance and Sexual Activity   • Alcohol use: No   • Drug use: No   • Sexual activity: Defer       FAMILY HISTORY:  Family History   Problem Relation Age of Onset   • Breast cancer Mother 67   • Heart disease Father    • Ovarian cancer Neg Hx    • Endometrial cancer Neg Hx        REVIEW OF SYSTEMS:  Review of Systems   Constitutional:  Negative for chills, fatigue and fever.   HENT:  Negative for congestion, ear pain, rhinorrhea, sinus pressure and sore throat.    Eyes:  Negative for visual disturbance.   Respiratory:  Negative for cough, chest tightness, shortness of breath and wheezing.    Cardiovascular:  Negative for chest pain, palpitations and leg swelling.   Gastrointestinal:  Negative for abdominal pain, blood in stool, constipation, diarrhea, nausea and vomiting.   Endocrine: Negative for polydipsia and polyuria.   Genitourinary:  Negative for dysuria and hematuria.   Musculoskeletal:  Negative for arthralgias and back pain.   Skin:  Negative for rash.   Neurological:  Negative for dizziness, light-headedness, numbness and headaches.   Psychiatric/Behavioral:  Negative for dysphoric mood and sleep disturbance. The patient is not nervous/anxious.         PHYSICAL EXAMINATION:     VITAL SIGNS:  /72   Pulse 72   Temp 97.6 °F (36.4 °C)   Resp 18   Wt 79.5 kg (175 lb 3.2 oz)   SpO2 96%   BMI 26.64 kg/m²     Physical Exam  Vitals and nursing note reviewed.   Constitutional:       Appearance: Normal appearance. She is well-developed.      Comments: She  is blind. She is also wheelchair bound.   HENT:      Head: Normocephalic and atraumatic.      Nose: Nose normal.      Mouth/Throat:      Mouth: Mucous membranes are moist.      Pharynx: No oropharyngeal exudate.   Eyes:      General: No scleral icterus.     Conjunctiva/sclera: Conjunctivae normal.      Pupils: Pupils are equal, round, and reactive to light.      Comments: blind   Neck:      Thyroid: No thyromegaly.   Cardiovascular:      Rate and Rhythm: Normal rate and regular rhythm.      Heart sounds: Normal heart sounds. No murmur heard.     No friction rub. No gallop.   Pulmonary:      Effort: Pulmonary effort is normal. No respiratory distress.      Breath sounds: Normal breath sounds. No wheezing.   Abdominal:      General: Bowel sounds are normal. There is no distension.      Palpations: Abdomen is soft.      Tenderness: There is no abdominal tenderness.   Musculoskeletal:         General: No deformity or signs of injury.      Cervical back: Normal range of motion and neck supple.   Lymphadenopathy:      Cervical: No cervical adenopathy.   Skin:     General: Skin is warm and dry.      Findings: No rash.   Neurological:      Mental Status: She is alert and oriented to person, place, and time.   Psychiatric:         Mood and Affect: Mood normal.         Behavior: Behavior normal.       RECORDS REVIEW:       ASSESSMENT     Diagnoses and all orders for this visit:    1. Dementia with behavioral disturbance (Primary)    2. Anxiety and depression    3. Chronic idiopathic constipation    4. Hypothyroidism (acquired)    5. Other hyperlipidemia    6. Gastroesophageal reflux disease without esophagitis        Assessment & Plan      Dementia.  - She will continue supportive care and nursing facility for ADLs as well as start physical therapy for strengthening.   -Monitor CBC, CMP, vitamin D levels every 3 months.    Insomnia  -Stable on current regimen.    GERD.  - omeprazole regimen reduced. may continue omeprazole  20mg PO QD.     Blind in both eyes  - cont supportive care    Mood disorder.  - She will continue Seroquel 25 mg nightly and Prozac 20 mg daily along with Buspirone    Constipation.  -Continue lactulose twice a day as needed.    Hypothyroidism.  -stable on Synthroid 50 mcg. We will monitor TSH every 3 months.    Osteoporosis.  -Alendronate discontinued 8/27/2024 for drug holiday.         [x]  Discussed Patient in detail with nursing/staff, addressed all needs today.     [x]  Plan of Care Reviewed   []  PT/OT Reviewed   [x]  Order Changes  []  Discharge Plans Reviewed  [x]  Advance Directive on file with Nursing Home.   [x]  POA on file with Nursing Home.    [x]  Code Status listed and reviewed.     I confirm accuracy of unchanged data/findings including physical exam and plan which have been carried forward from previous visit, as well as I have updated appropriately those that have changed        Con Shook DO.  11/1/2024      Patient or patient representative verbalized consent for the use of Ambient Listening during the visit with  Con Shook DO for chart documentation. 11/1/2024  14:24 EDT    hair removal not indicated

## 2024-12-03 NOTE — ASU PATIENT PROFILE, ADULT - NS PREOP UNDERSTANDS INFO
In an effort to ensure that our patients [employer]Well, a Team Member has reviewed your chart and identified an opportunity to provide the best care possible. An attempt was made to discuss or schedule due or overdue Preventive or Chronic Condition care.Care Gaps identified: Diabetes Eye Exam.    The Outcome was Contact was not made, no answer/busy. We are attempting to schedule a Eye Exam appointment. If you have any questions or need help with scheduling, contact our Health Outreach Team at [phone].   Type of Appointment needed:  Diabetes Eye Exam   
yes

## 2025-01-24 ENCOUNTER — APPOINTMENT (OUTPATIENT)
Dept: ORTHOPEDIC SURGERY | Facility: CLINIC | Age: 65
End: 2025-01-24
Payer: COMMERCIAL

## 2025-01-24 DIAGNOSIS — M54.50 LOW BACK PAIN, UNSPECIFIED: ICD-10-CM

## 2025-01-24 DIAGNOSIS — M54.2 CERVICALGIA: ICD-10-CM

## 2025-01-24 PROCEDURE — 99203 OFFICE O/P NEW LOW 30 MIN: CPT

## 2025-02-20 ENCOUNTER — APPOINTMENT (OUTPATIENT)
Dept: PAIN MANAGEMENT | Facility: CLINIC | Age: 65
End: 2025-02-20

## 2025-05-27 ENCOUNTER — APPOINTMENT (OUTPATIENT)
Dept: VASCULAR SURGERY | Facility: CLINIC | Age: 65
End: 2025-05-27

## 2025-06-16 ENCOUNTER — TRANSCRIPTION ENCOUNTER (OUTPATIENT)
Age: 65
End: 2025-06-16

## 2025-06-16 ENCOUNTER — INPATIENT (INPATIENT)
Facility: HOSPITAL | Age: 65
LOS: 0 days | Discharge: LEFT BEFORE DISCHARGE | DRG: 918 | End: 2025-06-16
Attending: STUDENT IN AN ORGANIZED HEALTH CARE EDUCATION/TRAINING PROGRAM | Admitting: FAMILY MEDICINE
Payer: COMMERCIAL

## 2025-06-16 VITALS
DIASTOLIC BLOOD PRESSURE: 75 MMHG | OXYGEN SATURATION: 100 % | TEMPERATURE: 98 F | RESPIRATION RATE: 18 BRPM | SYSTOLIC BLOOD PRESSURE: 121 MMHG | HEART RATE: 74 BPM

## 2025-06-16 VITALS
TEMPERATURE: 98 F | WEIGHT: 110.01 LBS | SYSTOLIC BLOOD PRESSURE: 155 MMHG | RESPIRATION RATE: 18 BRPM | OXYGEN SATURATION: 100 % | DIASTOLIC BLOOD PRESSURE: 87 MMHG | HEIGHT: 61 IN | HEART RATE: 90 BPM

## 2025-06-16 DIAGNOSIS — T47.2X1A: ICD-10-CM

## 2025-06-16 DIAGNOSIS — E87.1 HYPO-OSMOLALITY AND HYPONATREMIA: ICD-10-CM

## 2025-06-16 DIAGNOSIS — Z98.891 HISTORY OF UTERINE SCAR FROM PREVIOUS SURGERY: Chronic | ICD-10-CM

## 2025-06-16 DIAGNOSIS — R11.10 VOMITING, UNSPECIFIED: ICD-10-CM

## 2025-06-16 DIAGNOSIS — Z53.29 PROCEDURE AND TREATMENT NOT CARRIED OUT BECAUSE OF PATIENT'S DECISION FOR OTHER REASONS: ICD-10-CM

## 2025-06-16 DIAGNOSIS — E78.5 HYPERLIPIDEMIA, UNSPECIFIED: ICD-10-CM

## 2025-06-16 DIAGNOSIS — Z82.49 FAMILY HISTORY OF ISCHEMIC HEART DISEASE AND OTHER DISEASES OF THE CIRCULATORY SYSTEM: ICD-10-CM

## 2025-06-16 DIAGNOSIS — R19.7 DIARRHEA, UNSPECIFIED: ICD-10-CM

## 2025-06-16 DIAGNOSIS — Z80.3 FAMILY HISTORY OF MALIGNANT NEOPLASM OF BREAST: ICD-10-CM

## 2025-06-16 DIAGNOSIS — E87.6 HYPOKALEMIA: ICD-10-CM

## 2025-06-16 DIAGNOSIS — Z88.2 ALLERGY STATUS TO SULFONAMIDES: ICD-10-CM

## 2025-06-16 DIAGNOSIS — M81.0 AGE-RELATED OSTEOPOROSIS WITHOUT CURRENT PATHOLOGICAL FRACTURE: ICD-10-CM

## 2025-06-16 DIAGNOSIS — Z98.890 OTHER SPECIFIED POSTPROCEDURAL STATES: Chronic | ICD-10-CM

## 2025-06-16 DIAGNOSIS — K52.1 TOXIC GASTROENTERITIS AND COLITIS: ICD-10-CM

## 2025-06-16 LAB
ALBUMIN SERPL ELPH-MCNC: 4.4 G/DL — SIGNIFICANT CHANGE UP (ref 3.5–5.2)
ALP SERPL-CCNC: 57 U/L — SIGNIFICANT CHANGE UP (ref 30–115)
ALT FLD-CCNC: 14 U/L — SIGNIFICANT CHANGE UP (ref 0–41)
ANION GAP SERPL CALC-SCNC: 15 MMOL/L — HIGH (ref 7–14)
ANION GAP SERPL CALC-SCNC: 15 MMOL/L — HIGH (ref 7–14)
ANION GAP SERPL CALC-SCNC: 18 MMOL/L — HIGH (ref 7–14)
AST SERPL-CCNC: 32 U/L — SIGNIFICANT CHANGE UP (ref 0–41)
BASOPHILS # BLD AUTO: 0.03 K/UL — SIGNIFICANT CHANGE UP (ref 0–0.2)
BASOPHILS NFR BLD AUTO: 0.2 % — SIGNIFICANT CHANGE UP (ref 0–1)
BILIRUB SERPL-MCNC: 1.5 MG/DL — HIGH (ref 0.2–1.2)
BUN SERPL-MCNC: 6 MG/DL — LOW (ref 10–20)
BUN SERPL-MCNC: 7 MG/DL — LOW (ref 10–20)
BUN SERPL-MCNC: 9 MG/DL — LOW (ref 10–20)
CALCIUM SERPL-MCNC: 7.5 MG/DL — LOW (ref 8.4–10.5)
CALCIUM SERPL-MCNC: 7.9 MG/DL — LOW (ref 8.4–10.5)
CALCIUM SERPL-MCNC: 8.5 MG/DL — SIGNIFICANT CHANGE UP (ref 8.4–10.5)
CHLORIDE SERPL-SCNC: 84 MMOL/L — LOW (ref 98–110)
CHLORIDE SERPL-SCNC: 91 MMOL/L — LOW (ref 98–110)
CHLORIDE SERPL-SCNC: 92 MMOL/L — LOW (ref 98–110)
CO2 SERPL-SCNC: 18 MMOL/L — SIGNIFICANT CHANGE UP (ref 17–32)
CO2 SERPL-SCNC: 21 MMOL/L — SIGNIFICANT CHANGE UP (ref 17–32)
CO2 SERPL-SCNC: 21 MMOL/L — SIGNIFICANT CHANGE UP (ref 17–32)
CREAT ?TM UR-MCNC: 11 MG/DL — SIGNIFICANT CHANGE UP
CREAT SERPL-MCNC: 0.6 MG/DL — LOW (ref 0.7–1.5)
CREAT SERPL-MCNC: 0.6 MG/DL — LOW (ref 0.7–1.5)
CREAT SERPL-MCNC: 0.7 MG/DL — SIGNIFICANT CHANGE UP (ref 0.7–1.5)
EGFR: 100 ML/MIN/1.73M2 — SIGNIFICANT CHANGE UP
EGFR: 97 ML/MIN/1.73M2 — SIGNIFICANT CHANGE UP
EGFR: 97 ML/MIN/1.73M2 — SIGNIFICANT CHANGE UP
EOSINOPHIL # BLD AUTO: 0.01 K/UL — SIGNIFICANT CHANGE UP (ref 0–0.7)
EOSINOPHIL NFR BLD AUTO: 0.1 % — SIGNIFICANT CHANGE UP (ref 0–8)
GLUCOSE BLDC GLUCOMTR-MCNC: 167 MG/DL — HIGH (ref 70–99)
GLUCOSE SERPL-MCNC: 159 MG/DL — HIGH (ref 70–99)
GLUCOSE SERPL-MCNC: 92 MG/DL — SIGNIFICANT CHANGE UP (ref 70–99)
GLUCOSE SERPL-MCNC: 99 MG/DL — SIGNIFICANT CHANGE UP (ref 70–99)
HCT VFR BLD CALC: 40.3 % — SIGNIFICANT CHANGE UP (ref 37–47)
HGB BLD-MCNC: 13.8 G/DL — SIGNIFICANT CHANGE UP (ref 12–16)
IMM GRANULOCYTES NFR BLD AUTO: 0.3 % — SIGNIFICANT CHANGE UP (ref 0.1–0.3)
LIDOCAIN IGE QN: 29 U/L — SIGNIFICANT CHANGE UP (ref 7–60)
LYMPHOCYTES # BLD AUTO: 1.16 K/UL — LOW (ref 1.2–3.4)
LYMPHOCYTES # BLD AUTO: 9.7 % — LOW (ref 20.5–51.1)
MAGNESIUM SERPL-MCNC: 1.7 MG/DL — LOW (ref 1.8–2.4)
MCHC RBC-ENTMCNC: 28.2 PG — SIGNIFICANT CHANGE UP (ref 27–31)
MCHC RBC-ENTMCNC: 34.2 G/DL — SIGNIFICANT CHANGE UP (ref 32–37)
MCV RBC AUTO: 82.4 FL — SIGNIFICANT CHANGE UP (ref 81–99)
MONOCYTES # BLD AUTO: 0.44 K/UL — SIGNIFICANT CHANGE UP (ref 0.1–0.6)
MONOCYTES NFR BLD AUTO: 3.7 % — SIGNIFICANT CHANGE UP (ref 1.7–9.3)
NEUTROPHILS # BLD AUTO: 10.33 K/UL — HIGH (ref 1.4–6.5)
NEUTROPHILS NFR BLD AUTO: 86 % — HIGH (ref 42.2–75.2)
NRBC BLD AUTO-RTO: 0 /100 WBCS — SIGNIFICANT CHANGE UP (ref 0–0)
OSMOLALITY UR: 180 MOS/KG — SIGNIFICANT CHANGE UP (ref 50–1200)
PHOSPHATE 24H UR-MCNC: 21 MG/DL — SIGNIFICANT CHANGE UP
PLATELET # BLD AUTO: 219 K/UL — SIGNIFICANT CHANGE UP (ref 130–400)
PMV BLD: 10.8 FL — HIGH (ref 7.4–10.4)
POTASSIUM SERPL-MCNC: 3.4 MMOL/L — LOW (ref 3.5–5)
POTASSIUM SERPL-MCNC: 3.5 MMOL/L — SIGNIFICANT CHANGE UP (ref 3.5–5)
POTASSIUM SERPL-MCNC: 3.7 MMOL/L — SIGNIFICANT CHANGE UP (ref 3.5–5)
POTASSIUM SERPL-SCNC: 3.4 MMOL/L — LOW (ref 3.5–5)
POTASSIUM SERPL-SCNC: 3.5 MMOL/L — SIGNIFICANT CHANGE UP (ref 3.5–5)
POTASSIUM SERPL-SCNC: 3.7 MMOL/L — SIGNIFICANT CHANGE UP (ref 3.5–5)
POTASSIUM UR-SCNC: 8 MMOL/L — SIGNIFICANT CHANGE UP
PROT ?TM UR-MCNC: <5 MG/DL — SIGNIFICANT CHANGE UP
PROT ?TM UR-MCNC: <5 MG/DL — SIGNIFICANT CHANGE UP
PROT SERPL-MCNC: 7.1 G/DL — SIGNIFICANT CHANGE UP (ref 6–8)
PROT/CREAT UR-RTO: <0.5 RATIO — HIGH (ref 0–0.2)
RBC # BLD: 4.89 M/UL — SIGNIFICANT CHANGE UP (ref 4.2–5.4)
RBC # FLD: 12.8 % — SIGNIFICANT CHANGE UP (ref 11.5–14.5)
SODIUM SERPL-SCNC: 120 MMOL/L — LOW (ref 135–146)
SODIUM SERPL-SCNC: 127 MMOL/L — LOW (ref 135–146)
SODIUM SERPL-SCNC: 128 MMOL/L — LOW (ref 135–146)
SODIUM UR-SCNC: 54 MMOL/L — SIGNIFICANT CHANGE UP
WBC # BLD: 12.01 K/UL — HIGH (ref 4.8–10.8)
WBC # FLD AUTO: 12.01 K/UL — HIGH (ref 4.8–10.8)

## 2025-06-16 PROCEDURE — 80048 BASIC METABOLIC PNL TOTAL CA: CPT

## 2025-06-16 PROCEDURE — 84300 ASSAY OF URINE SODIUM: CPT

## 2025-06-16 PROCEDURE — 99222 1ST HOSP IP/OBS MODERATE 55: CPT

## 2025-06-16 PROCEDURE — 36415 COLL VENOUS BLD VENIPUNCTURE: CPT

## 2025-06-16 PROCEDURE — 84156 ASSAY OF PROTEIN URINE: CPT

## 2025-06-16 PROCEDURE — 82570 ASSAY OF URINE CREATININE: CPT

## 2025-06-16 PROCEDURE — 74177 CT ABD & PELVIS W/CONTRAST: CPT | Mod: 26

## 2025-06-16 PROCEDURE — 84105 ASSAY OF URINE PHOSPHORUS: CPT

## 2025-06-16 PROCEDURE — 99285 EMERGENCY DEPT VISIT HI MDM: CPT

## 2025-06-16 PROCEDURE — 99236 HOSP IP/OBS SAME DATE HI 85: CPT

## 2025-06-16 PROCEDURE — 84133 ASSAY OF URINE POTASSIUM: CPT

## 2025-06-16 PROCEDURE — 83935 ASSAY OF URINE OSMOLALITY: CPT

## 2025-06-16 PROCEDURE — 93010 ELECTROCARDIOGRAM REPORT: CPT

## 2025-06-16 PROCEDURE — 74177 CT ABD & PELVIS W/CONTRAST: CPT

## 2025-06-16 RX ORDER — ROSUVASTATIN CALCIUM 20 MG/1
5 TABLET, FILM COATED ORAL AT BEDTIME
Refills: 0 | Status: DISCONTINUED | OUTPATIENT
Start: 2025-06-16 | End: 2025-06-16

## 2025-06-16 RX ORDER — ONDANSETRON HCL/PF 4 MG/2 ML
4 VIAL (ML) INJECTION EVERY 6 HOURS
Refills: 0 | Status: DISCONTINUED | OUTPATIENT
Start: 2025-06-16 | End: 2025-06-16

## 2025-06-16 RX ORDER — ONDANSETRON HCL/PF 4 MG/2 ML
4 VIAL (ML) INJECTION ONCE
Refills: 0 | Status: COMPLETED | OUTPATIENT
Start: 2025-06-16 | End: 2025-06-16

## 2025-06-16 RX ORDER — MAGNESIUM SULFATE 500 MG/ML
1 SYRINGE (ML) INJECTION ONCE
Refills: 0 | Status: COMPLETED | OUTPATIENT
Start: 2025-06-16 | End: 2025-06-16

## 2025-06-16 RX ORDER — MELATONIN 5 MG
5 TABLET ORAL AT BEDTIME
Refills: 0 | Status: DISCONTINUED | OUTPATIENT
Start: 2025-06-16 | End: 2025-06-16

## 2025-06-16 RX ORDER — ACETAMINOPHEN 500 MG/5ML
650 LIQUID (ML) ORAL EVERY 6 HOURS
Refills: 0 | Status: DISCONTINUED | OUTPATIENT
Start: 2025-06-16 | End: 2025-06-16

## 2025-06-16 RX ADMIN — Medication 4 MILLIGRAM(S): at 04:10

## 2025-06-16 RX ADMIN — Medication 650 MILLIGRAM(S): at 11:30

## 2025-06-16 RX ADMIN — Medication 50 MILLILITER(S): at 07:51

## 2025-06-16 RX ADMIN — Medication 1000 MILLILITER(S): at 01:58

## 2025-06-16 RX ADMIN — Medication 650 MILLIGRAM(S): at 10:30

## 2025-06-16 RX ADMIN — Medication 1000 MILLILITER(S): at 04:09

## 2025-06-16 RX ADMIN — Medication 40 MILLIEQUIVALENT(S): at 13:31

## 2025-06-16 RX ADMIN — Medication 4 MILLIGRAM(S): at 01:58

## 2025-06-16 RX ADMIN — Medication 40 MILLIGRAM(S): at 06:30

## 2025-06-16 RX ADMIN — Medication 100 GRAM(S): at 06:29

## 2025-06-16 NOTE — ED PROVIDER NOTE - OBJECTIVE STATEMENT
65-year-old female, history of osteoporosis, HLD, took MiraLAX and Dulcolax as a prep for colonoscopy experienced multiple episodes of vomiting diarrhea and states that vision was blurry.

## 2025-06-16 NOTE — PATIENT PROFILE ADULT - FALL HARM RISK - HARM RISK INTERVENTIONS
Assistance with ambulation/Assistance OOB with selected safe patient handling equipment/Communicate Risk of Fall with Harm to all staff/Monitor gait and stability/Reinforce activity limits and safety measures with patient and family/Sit up slowly, dangle for a short time, stand at bedside before walking/Tailored Fall Risk Interventions/Visual Cue: Yellow wristband and red socks/Bed in lowest position, wheels locked, appropriate side rails in place/Call bell, personal items and telephone in reach/Instruct patient to call for assistance before getting out of bed or chair/Non-slip footwear when patient is out of bed/[place] to call system/Physically safe environment - no spills, clutter or unnecessary equipment/Purposeful Proactive Rounding/Room/bathroom lighting operational, light cord in reach

## 2025-06-16 NOTE — DISCHARGE NOTE NURSING/CASE MANAGEMENT/SOCIAL WORK - PATIENT PORTAL LINK FT
You can access the FollowMyHealth Patient Portal offered by [facility] by registering at the following website: [email or web address]. By joining [facility]’s FollowMyHealth portal, you will also be able to view your health information using other applications (apps) compatible with our system.

## 2025-06-16 NOTE — H&P ADULT - ASSESSMENT
Patient is 64 yo female presenting with     #N/V/D  #Hyponatremia  -Follow up CT scan  -IVF  -Clears  -Nephrology consult   -Avoid sodium overcorrection    #HLD  -Continue with statin     #Progress Note Handoff  Pending (specify):  as above   Family discussion:  plan of care was discussed with patient and family in details.  all questions were answered.  seems to understand, and in agreement  Disposition:  home

## 2025-06-16 NOTE — PATIENT PROFILE ADULT - NSPROGENSOURCEINFO_GEN_A_NUR
[de-identified] : \par  [facility] MRI Report             Final\par \par No Documents Attached\par \par \par \par \par   [facility]\par                                          [address]\par                                    [place], [place]  [address]\par                                        Department of Radiology\par                                             [phone]\par \par \par Patient Name:      VA HALEY                Location:       [facility]\par Med Rec #:        [id number]                    Account #:      [id number]\par YOB: 1951                    Ordering:       Enriqueta Arrington MD\par Age: 71               Sex:    F                 Attending:      Enriqueta Arrington MD\par PCP:        Heri Andrade MD\par ______________________________________________________________________________________\par \par Exam Date:      05/28/22\par Exam:         MRI LUMBAR SPINE WAW \par Order#:       [id number]\par \par \par \par EXAM: MRI lumbar spine without contrast\par \par  INDICATION: Lumbar back pain\par \par  TECHNIQUE: MR exam of lumbar spine with and without contrast utilizing sagittal/axial T1\par postcontrast coronal T2, sagittal T2 STIR, sagittal T2, sagittal T1, axial T1, axial T2 sequences\par \par  7.5 mL Gadavist administered intravenously.\par \par  COMPARISON: None available.\par \par  FINDINGS:\par \par \par \par  When counting inferiorly from craniocervical junction on total spine localizer sequences, there\par appear to be 7 cervical type, 12 thoracic-type and 5 lumbar type vertebral bodies. For purposes of\par this report, vertebral body at level of the iliolumbar ligament will be designated as L5.\par Inferiormost well-formed intervertebral disc space will be designated as L5-S1. No MR evidence for\par transitional lumbosacral anatomy.\par \par \par  Lumbar lordosis is maintained. No significant spondylolisthesis. Vertebral body heights are\par maintained. Vertebral body bone marrow signal within normal limits. No abnormal cord signal\par identified. Conus terminates at L1. No significant periarticular or paraspinal soft tissue edema is\par identified. There are multilevel degenerative endplate changes with osteophyte formation,\par intervertebral disc space narrowing/desiccation, Schmorl's nodes and endplate irregularity, most\par prominently at L1-L2 and to a lesser degree at L3-L4 where there are degenerative endplate edematous\par changes, Modic type I with associated vertebral body enhancement. No suspicious expansile or\par destructive osseous lesion identified. Paraspinal soft tissues are unremarkable.\par \par  There are multilevel findings as follows:\par \par  Disc bulge, bilateral facet arthropathy, ligamentous hypertrophy contributing to mild canal\par stenosis and mild bilateral foraminal stenosis.\par \par  T12-L1: No significant canal or foraminal stenosis.\par \par  L1-L2: No significant canal or foraminal stenosis.\par \par  L2-L3: No significant canal or foraminal stenosis.\par \par  L3-L4: Disc bulge, bilateral facet arthropathy, ligamentous hypertrophy contributing to no\par significant canal stenosis and mild bilateral foraminal stenosis.\par \par  L4-L5: Disc bulge, bilateral facet arthropathy, ligamentous hypertrophy contributing to no\par significant canal stenosis and moderate bilateral foraminal stenosis.\par \par  L5-S1: Disc bulge, bilateral facet arthropathy, ligamentous hypertrophy contributing to no\par significant canal stenosis and mild to moderate bilateral foraminal stenosis.\par \par \par \par  IMPRESSION:\par \par  Multilevel lumbar spondylitic changes as detailed above notable for mild to moderate bilateral\par foraminal stenosis at L3-L4, L4-L5 and L5-S1 involving exiting L3, L4, L5 nerve roots, respectively.\par No significant canal stenosis of the lumbar spine.\par \par  Multilevel degenerative endplate changes most prominently at L1-L2 and L3-L4, where are\par degenerative endplate edematous changes, Modic type I with associated vertebral body enhancement.\par \par \par  If there are questions/need for clarification regarding this report, Dr. Delonte Gong can be best\par reached via the patient [facility] system at [place] [address]@[facility].\par \par  --- End of Report ---\par \par ***Electronically Signed ***\par -----------------------------------------------\par Delonte Whitmore MD              05/31/22 1822\par \par Dictated on 05/31/22\par \par \par Report cc:  Enriqueta Arrington MD;\par \par  \par \par  Ordered by: ENRIQUETA ARRINGTON       Collected/Examined: [id number] 11:00AM       \par Verified by: ENRIQUETA ARRINGTON 01Jun2022 12:24PM       \par  Result Communication: No patient communication needed at this time;\par Stage: Final       \par  Performed at: [facility]       Resulted: [id number] 06:22PM       Last Updated: 01Jun2022 12:24PM       Accession: [id number]        patient

## 2025-06-16 NOTE — DISCHARGE NOTE PROVIDER - NSDCCPCAREPLAN_GEN_ALL_CORE_FT
PRINCIPAL DISCHARGE DIAGNOSIS  Diagnosis: Hyponatremia  Assessment and Plan of Treatment: You had severely low sodium which has improved with IV hydration, howevere, this condition has not fully resolved. Please follow up with your primary care provider to recheck your electrolyte levels this week as you are choosing to leave against medical advice.      SECONDARY DISCHARGE DIAGNOSES  Diagnosis: Nausea vomiting and diarrhea  Assessment and Plan of Treatment:

## 2025-06-16 NOTE — DISCHARGE NOTE PROVIDER - NSDCMRMEDTOKEN_GEN_ALL_CORE_FT
Melatonin 5 mg oral tablet: 1 tab(s) orally once a day (at bedtime)  ondansetron 4 mg oral tablet: 1 tab(s) orally every 8 hours  risedronate 35 mg oral tablet: 1 tab(s) orally once a week Q SUNDAYS  rosuvastatin 5 mg oral capsule: 1 cap(s) orally 2 times a week  traMADol 50 mg oral tablet: 1-2 tab(s) orally every 4 hours as needed for severe pain. MDD:6  Vitamin D2: orally once a day

## 2025-06-16 NOTE — ED ADULT NURSE REASSESSMENT NOTE - NS ED NURSE REASSESS COMMENT FT1
Pt going from ct scan to 3 floor bed, Dr Rowe met pt and was going up in elevator with pt and transport and I went back to Ed as Dr Rowe was with pt.

## 2025-06-16 NOTE — ED ADULT NURSE NOTE - NS ED NURSE REPORT GIVEN DT
**THIS VISIT WAS DONE BY TWO-WAY LIVE AUDIO/VIDEO TECHNOLOGY ON 6/24/20 AT 10:15am**    THE PATIENT/PARENT VERBALLY AGREES TO THIS VISIT    Writer spoke with patient and parent to obtain history. Patient and writer both in their respective homes during the visit.     Reason for Visit:  Diagnostic Evaluation  Patient accompanied by: mother  Writer met with the patient and mother together and spoke with both individually to gather history.    Total time spent: 60 minute(s)    BRIEF HISTORY OF PRESENT ILLNESS:    Kike Johnson is a 6 year old  female who presents to clinic for diagnostic evaluation due to concerns related to emotional regulation and inattention.     Emotional/Behavioral: Since April 2020, and more specifically following COVID-19 pandemic restrictions, parents have been seeing increased behavioral challenges at home.  Mom began noticing periods where Kike will get easily frustrated, angry and have explosive outbursts.  Mom describes Kike to be short tempered from young age and not experiencing significant aggressive behaviors until recently.  During the outburst, patient would become physical, hitting, attempting to bite and would struggle with calming down.  Parents tried to focus on positives and recently began a behavioral plan where she would earn marbles which eventually she could turn in for bigger prizes/rewards.  Since implementing behavior system, mom has seen improvement with fewer, severe tantrums and more age-appropriate outbursts.  For a while, mom mentions that it felt as though parents were walking on eggshells, not knowing when she would be \"set off\".  No prior history of aggression preceding COVID-19 pandemic.  Triggers oftentimes for outbursts include being told no, limit setting or if she becomes frustrated if something does not go right.  Frequency of outbursts was about 2-3 times a week and lately it has been less frequent.  Duration of episodes could be anywhere from a few  minutes to 20 minutes.  Mom does not see chronically irritable mood and in general finds that she is a happy child.  In speaking with the patient directly, she presented pleasant and content.  She reported feeling happy most days though sometimes feeling angry if she is not around her cat.  During the interview, patient was overheard at times yelling in frustration because she could not find a doll.  Since COVID-19 pandemic, mom notes that she has struggled with transition back to home as she misses her friends and school.     ADHD evaluation: Throughout the session, patient was observed to be quite impulsive, hyperverbal, at times interrupting mom.  She was constantly moving from sitting on mom's lap to moving about the room.  Mom describes her to be in the  setting last year which was more play based and not as structured.  Mom mentions concerns that the patient can be very impulsive, easily distracted and struggles with moving from one task to the next.  Mom has also observed challenges with sustaining attention to tasks, following directions and noticing some these symptoms since she was 4 years old.  At times, Kike will zone out.  Mom also sees fidgety behaviors, intrusiveness.  Her  did not have specific feedback with the exception of noting that Kike is very energetic.     Anxiety: At times, mom has seen concerns for anxiety.  Patient's father is a retired  and now working in security.  Patient will at times worry about dad leaving for work given recent protests and riots.  She also sometimes worries when dad travels for work. She has reported to mom worrying about coyotes.  Mom also sees Kike become anxious when she is the center of attention.  Mom does not see anxious behaviors or worries occurring most days nor does mom feel it is something that is causing significant impairment at this time.     Sleep: Patient goes to bed around 8pm and is asleep by a few min.  Awake by 5:45am. Patient denies taking any naps during the day.     PSYCHIATRY HISTORY (Dates, Locations, Diagnoses)    Previous diagnosis: Per mother, patient has no prior psychiatric history of note  Neuropsychological testing: none  Previous outpatient treatment:   - Therapy: Lissa Rocha   - Medication management: none  Previous medication trials:    - none  Previous hospitalizations:   - IPU: none   - PHP/IOP: none  Previous suicide attempts/self-injury: none  Current outpatient providers:   - Therapy: Lissa Rocha    DEVELOPMENTAL HISTORY  Prenatal:   subchorionic hematoma->was on bed rest from 12 weeks onward  :   Cesarian delivery: emergency due to drop in fetal HR  Birth weight: 8lbs 11oz  Full-term  Developmental Milestones:   Age at walkin months  Age at first words: 12  months  Temperament:   Average/Typical  Typical Discipline: taking away privileges    MEDICAL HISTORY  - Allergies: NKDA  - Seasonal allergies  - History of recurrent ear infections  - Head injury: none, Loss of consciousness:  No  - Seizures: none  - Medical Hospitalizations (dates, locations, reasons): none  - Date of last completed physical exam: 2019,  Normal  - Immunizations up to date:  Yes  - Past surgical history:  Ear tubes, adenoid removal    - Pediatrician: Dr. Tang     MEDICAL PROBLEMS - RELATIVES:  - No history of heart disease, early/sudden death, tics, headaches, diabetes, thyroid problems or seizures reported    PSYCHIATRIC PROBLEMS - RELATIVES:  - ADHD: mom wonders about dad (not diagnosed), maternal aunt  - Anxiety: dad (takes medication), paternal aunt, paternal cousin  - Substance abuse: none reported  - Suicide completion: none reported    SOCIAL HISTORY  Stressors:   Missing school due to COVID-19, not seeing friends  Family [place]  Patient lives at home with parents.    Mother: Birth, Name: Romeliaelieser Johnson, Age: 41 years  Father:  Birth, Name: Lalo Alex, Age: 51 years    Legal  Guardianship of Patient:  Biological Mother  Biological Father  Highest Education Level  Father: bachelors in law enforcement  Mother: Masters in education and administration  Occupation:  Father: retired , works in security/  Mother:   Zoroastrian Preference:  Evangelical  DCFS Involvement: No  Juvenile Court Involvement: No    SCHOOL HISTORY  Current school: [employer]  Grade level:  Will start [employer] in fall, Failed previous grade: No  Other school history:    Attendance:  good  Behavior:  good  - Grades: Average  - Special education: No  - Honors/accelerated program: No  - IEP/504 plan: none    INTERESTS  Draw, color, paint, legos    REVIEW OF SYMPTOMS  INATTENTION/HYPERACTIVITY/IMPULSIVITY:  Inattention:    Often is inattentive to details in schoolwork   Often has difficulty sustaining attention in tasks  Often does not listen  Often fails to finish schoolwork/chores  Often has difficulty organizing tasks  Often avoids/dislikes doing schoolwork  Often loses things necessary for tasks  Often is easily distracted  Often is forgetful  Hyperactive/Impulsive:   Often fidgets/squirms  Often leaves seat  Often runs/climbs or feels restless  Often has difficulty playing quietly  Often is \"on the go\"   Often blurts out answers  Often can't wait turn   Often interrupts/intrudes  Inattention Symptom Count: >/=6? Yes  Hyperactive/Impulsive Symptom Count: >/= 6? Yes  Symptom Onset: < 7 years? Yes  Symptom Duration: >/= 6 months? Yes  Impairment:  >/= 2 settings? Yes    ANGER, OPPOSITIONAL, EXPLOSIVE BEHAVIOR:   Often loses temper  Often argues with adults  Often refuses to comply with requests/rules  Often deliberately annoys people  Often is easily annoyed by others  Symptom Count: >/= 4? Yes  Symptom Duration: >/= 6 months? Yes  Impairment: Home/school/peers?  No    Disruptive Mood Dysregulation Disorder:  A. Severe recurrent temper outbursts manifested verbally (e.g., verbal rages)  and/or behaviorally (e.g., physical aggression toward people or property) that are grossly out of proportion in intensity or duration to the situation or provocation: Yes  B. The temper outbursts are inconsistent with developmental level: Yes  C. The tempter outbursts occur, on average, three or more times per week: Yes  D. The mood between temper outbursts is persistently irritable or angry most of the day, nearly every day, and is observable by others (e.g., parents, teacher, peers): No  E. Criteria A-D have been present for 12 months or more. Throughout that time, the individual has not had a period lasting 3 or more consecutive months without all of the symptoms in Criteria A-D: No  F. Criteria A and D are present in at least two of three settings (i.e., at home, at school, with peers) and are severe in at least one of these: No      ANTISOCIAL BEHAVIOR:   Not endorsed    SAD/IRRITABLE/EUPHORIC MOOD:   Major Depressive Episode:   Not endorsed    Dysthymia:    Not endorsed    Manic/Hypomanic Episode:    Not endorsed    SEPARATION ANXIETY:  Repeated reluctance to sleep away from attachment figure  Symptom Count: >/= 3 symptoms? No  Symptom Duration: >/= 4 weeks? No  Distress/Impairment: Home/school/peers? No    GENERALIZED ANXIETY:  A. Excessive anxiety and worry (apprehensive expectation) occurring more days than not for at least 6 months, about a number of events or activities (such as work or school performance): No  B. The individual finds it difficult to control the worry: No  C. The anxiety and worry are associated with three (or more) of the following six symptoms (with at least some symptoms having been present for more days than not for the past 6 months):   1. Restlessness or feeling keyed up/on edge: No   2. Easily fatigued: No   3. Difficulty concentrating or mind going blank: Yes   4. Irritability: Yes   5. Muscle tension: No   6. Sleep disturbance (difficulty falling or staying asleep, or restless,  unsatisfying sleep): No  D. The anxiety, worry, or physical symptoms cause clinically significant distress or impairment in social, occupational, or other important areas of functioning: No      PANIC DISORDER:  Not endorsed    SOCIAL ANXIETY:  Not endorsed    OBSESSIVE-COMPULSIVE DISORDER:  Not endorsed    TRAUMA-RELATED ANXIETY:  Not endorsed    ADJUSTMENT PROBLEMS:  Not endorsed    SOCIAL-EMOTIONAL RELATEDNESS:  Not endorsed     LEARNING PROBLEMS:  Not endorsed    COMMUNICATION PROBLEMS:  Articulation problems when younger    PSYCHOSIS:  Not endorsed    OTHER PROBLEMS:   Not endorsed    MENTAL STATUS EXAM:    Behavioral Observations: alert, bright, appropriate eye contact, impulsive, grabbing her cat and showing writer over video, interrupting mom, normal gait and station  Appearance: appropriate hygiene and grooming, casually dressed, average size and appears stated age  Separation from escort: Normal  Manner of relating to examiner: cooperative  Psychomotor activity: active, fidgety, no abnormal movements including tics, tremors, stereotypies, or catatonia  Muscle strength/tone: none  Speech: appropriate rate, rhythym, and volume  and no perseveration or paucity of language  Receptive Language: Normal  Expressive Language: Normal    Mood: \"good\"  Affective expression: content  Thought process: linear, no thought blocking, looseness of associations, tangential or circumstantial thinking or derailments  Though content: no delusions, obsessions/ruminations, phobias or preoccupations  Suicidal/Homicidal ideation: Absent  Perceptions: no auditory or visual hallucinations, no tactile or olfactory hallucinations, depersonalization or derealization  Orientation: person/place/time/situation  Attention/Concentration: distractible  Memory: did not assess  Impulse Control: Abnormal  Judgment: fair  Insight: age appropriate  Intelligence/Fund of knowledge (clinical estimate): average    Abuse & Neglect Screening    Completed: Yes    SCALES/RECORDS REVIEWED: previous records    FORMULATION  Kike Johnson is a 6 year old  female who presents to clinic for diagnostic evaluation due to concerns related to emotional regulation and inattention.    DIAGNOSIS:    Unspecified Disruptive, Impulse-Control, and Conduct Disorder  R/o Attention-Deficit/Hyperactivity Disorder, Combined Presentation   Adjustment disorder    TREATMENT RECOMMENDATIONS:   - Psychoeducation: We discussed suspicion of ADHD given parent report and presentation during visit. Discussed the possibility of obtaining feedback from school as mom still has contact with . Patient's mother was explained details of the illness along with treatment options available. The treatment of choice for ADHD consists of behavioral management therapy focused on working with both the patient and parents on reinforcing positive behaviors and skills. Regarding behavioral therapy, evidence based therapies include [facility] and PCIT as well as Parent Management Training. For more severe cases, medication intervention can be considered, which typically consists of treatment with stimulant medications as a first line option. Additional areas of focus include positive reinforcement and ways to discipline more effectively. Treatment also includes cognitive problem-solving skills training and social skills programs. Risks, benefits, and alternatives of treatment were discussed.  - Therapy: Recommend continued engagement in psychotherapy services with emphasis on improving interpersonal skills/communication, behavioral management training as well as coping skills to manage intense affect.    - Medication management: Will obtain feedback ([place]) from school for collateral as mom has contact with teacher. If we are unable to get feedback, we discussed the possibility of medication trial given significant impulsivity and mom's perception of its impact on Clotilde  relationship with peers.    - Safety Planning: Patient's mother was provided psychoeducation regarding safety planning including but not limited to keeping house safe by removing sharp objects and knifes and locking them up in a safe or lockbox. Patient's mother was also advised to keep firearms (if present), medications including over-the-counter medications locked in safe or locked cabinet. Patient's mother was advised to take patient to the nearest emergency room if she displayes risky behavior, expresses thoughts to harm self or others, or any other emergency.   - SCALES/RECORDS ORDERED: Dawn (teacher)  - Follow up: 1 month    Gray Ruvalcaba MD         16-Jun-2025 04:18

## 2025-06-16 NOTE — PROGRESS NOTE ADULT - SUBJECTIVE AND OBJECTIVE BOX
SINTIA MCKEON 65y Female  MRN#: [id number]   Hospital Day:     SUBJECTIVE  Patient is a 65y old Female who presents with a chief complaint of N/V/D (2025 05:31)  Currently admitted to medicine with the primary diagnosis of Hyponatremia      INTERVAL HPI AND OVERNIGHT EVENTS:  Patient was examined and seen at bedside. This morning she is resting comfortably in bed and reports     REVIEW OF SYMPTOMS:  CONSTITUTIONAL: No weakness, fevers or chills; No headaches  EYES: No visual changes, eye pain, or discharge  ENT: No vertigo; No ear pain or change in hearing; No sore throat or difficulty swallowing  NECK: No pain or stiffness  RESPIRATORY: No cough, wheezing, or hemoptysis; No shortness of breath  CARDIOVASCULAR: No chest pain or palpitations  GASTROINTESTINAL: No abdominal or epigastric pain; No nausea, vomiting, or hematemesis; No diarrhea or constipation; No melena or hematochezia  GENITOURINARY: No dysuria, frequency or hematuria  MUSCULOSKELETAL: No joint pain, no muscle pain, no weakness  NEUROLOGICAL: No numbness or weakness  SKIN: No itching or rashes    OBJECTIVE  PAST MEDICAL & SURGICAL HISTORY  Osteoporosis    High cholesterol    H/O  section    H/O lumpectomy  2013 PAPPILOMA      ALLERGIES:  sulfa drugs (Rash)    MEDICATIONS:  STANDING MEDICATIONS  melatonin 5 milliGRAM(s) Oral at bedtime  pantoprazole    Tablet 40 milliGRAM(s) Oral before breakfast  rosuvastatin 5 milliGRAM(s) Oral at bedtime  sodium chloride 0.9%. 1000 milliLiter(s) IV Continuous <Continuous>    PRN MEDICATIONS  acetaminophen     Tablet .. 650 milliGRAM(s) Oral every 6 hours PRN  ondansetron Injectable 4 milliGRAM(s) IV Push every 6 hours PRN      VITAL SIGNS: Last 24 Hours  T(C): 36.5 (2025 04:58), Max: 36.8 (2025 01:27)  T(F): 97.7 (2025 04:58), Max: 98.3 (2025 01:27)  HR: 86 (2025 04:58) (78 - 90)  BP: 124/78 (2025 04:58) (117/77 - 155/87)  BP(mean): 94 (2025 04:58) (94 - 94)  RR: 18 (2025 04:58) (16 - 18)  SpO2: 98% (2025 04:58) (98% - 100%)    LABS:                        13.8   12.01 )-----------( 219      ( 2025 02:14 )             40.3     06-16    120[L]  |  84[L]  |  9[L]  ----------------------------<  159[H]  3.7   |  18  |  0.7    Ca    8.5      2025 02:14  Mg     1.7     06-16    TPro  7.1  /  Alb  4.4  /  TBili  1.5[H]  /  DBili  x   /  AST  32  /  ALT  14  /  AlkPhos  57  06-16      Urinalysis Basic - ( 2025 02:14 )    Color: x / Appearance: x / SG: x / pH: x  Gluc: 159 mg/dL / Ketone: x  / Bili: x / Urobili: x   Blood: x / Protein: x / Nitrite: x   Leuk Esterase: x / RBC: x / WBC x   Sq Epi: x / Non Sq Epi: x / Bacteria: x                  RADIOLOGY:  < from: CT Abdomen and Pelvis w/ IV Cont (25 @ 04:35) >  IMPRESSION:  Questionable thickening of the distal esophagus (301/11) with small   hiatal hernia. Consider outpatient follow-up endoscopy depending on   clinical picture.    < end of copied text >    PHYSICAL EXAM:  CONSTITUTIONAL: No acute distress, well-groomed, AAOx3  HEAD: Atraumatic, normocephalic  EYES: EOM intact, PERRLA, conjunctiva and sclera clear  ENT: Supple, no masses, no thyromegaly, no bruits, no JVD  PULMONARY: no wheezes, rales, or rhonchi  CARDIOVASCULAR: Regular rate and rhythm; no murmurs, rubs, or gallops  GASTROINTESTINAL: Soft, non-tender, non-distended; bowel sounds present  MUSCULOSKELETAL: no clubbing, no cyanosis, no edema  NEUROLOGY: non-focal  SKIN: No rashes or lesions; warm and dry

## 2025-06-16 NOTE — DISCHARGE NOTE NURSING/CASE MANAGEMENT/SOCIAL WORK - NSDCPEFALRISK_GEN_ALL_CORE
For information on Fall & Injury Prevention, visit: [email or web address] OR  [email or web address] OR  https://www.cdc.gov/steadi/patient.html

## 2025-06-16 NOTE — ED ADULT TRIAGE NOTE - SPO2 (%)
Lab Results   Component Value Date    HGBA1C 6 9 (H) 10/03/2018       No results for input(s): POCGLU in the last 72 hours  Blood Sugar Average: Last 72 hrs:     slight increase in hemoglobin A1c up from 6 4 to 6 9%  Patient does admit to some dietary indiscretion  Still within range  Patient remains on metformin 500 mg twice daily  Continue same  Recheck diabetic parameters in 4 months    Patient is current with diabetic foot examination is through podiatrist  100

## 2025-06-16 NOTE — CONSULT NOTE ADULT - SUBJECTIVE AND OBJECTIVE BOX
pt known to me admitted with hyponatremia.  Na+ 120 -127.  Hold IVF, f/u serial bmp, avoid overcorrection.  Full consult to follow

## 2025-06-16 NOTE — DISCHARGE NOTE PROVIDER - HOSPITAL COURSE
65-year-old female, history of osteoporosis, HLD, took MiraLAX and Dulcolax as a prep for colonoscopy experienced multiple episodes of vomiting diarrhea and states that vision was blurry. Patient was found to have severe hyponatremia Na 120 which corrected to 127 after IV fluids. Patient then opted to leave against medical advice despite unresolved hyponatremia. Vomiting resolved.  65-year-old female, history of osteoporosis, HLD, took MiraLAX and Dulcolax as a prep for colonoscopy experienced multiple episodes of vomiting diarrhea and states that vision was blurry. Patient was found to have severe hyponatremia Na 120 which corrected to 127 after IV fluids. Patient then opted to leave against medical advice despite unresolved hyponatremia. Iatrogenic gastroenteritits resolved  Severe Hyponatremia impproved to 128  patient advised of risks of overcorrection resulting edema in the brain which can cause seizure and possibly death  patient and  understand and would like to take patient home AMA as she is feeling well

## 2025-06-16 NOTE — H&P ADULT - HISTORY OF PRESENT ILLNESS
patient is 65-year-old female, history of osteoporosis, HLD, took MiraLAX and Dulcolax as a prep for colonoscopy experienced multiple episodes of vomiting diarrhea and diffuse abdm pain.  pain is aching in nature, non-radiating, has no relieving or exacerbating factors.  abdm pain resolved now.  afebrile  had similar episode with bowel prep last year.  offers no other complaints

## 2025-06-16 NOTE — ED PROVIDER NOTE - CLINICAL SUMMARY MEDICAL DECISION MAKING FREE TEXT BOX
65-year-old female, history of osteoporosis, HLD, took MiraLAX and Dulcolax as a prep for colonoscopy, experienced multiple episode of vomiting and diarrhea associated with blurred vision.  Exam unremarkable.  Labs noted for WBC 12, hemoglobin 13, sodium 120, BUN 9, creatinine 0.7, lipase 29.  EKG normal sinus rhythm no acute changes.  Given NS and Zofran with improvement.  Will admit to telemetry.

## 2025-06-16 NOTE — ED ADULT NURSE NOTE - CHIEF COMPLAINT QUOTE
vomiting, diarrhea and focus of vision is off, states that last time she was like this her k was low

## 2025-06-16 NOTE — H&P ADULT - NSHPPHYSICALEXAM_GEN_ALL_CORE
Vital Signs Last 24 Hrs  T(C): 36.5 (16 Jun 2025 04:58), Max: 36.8 (16 Jun 2025 01:27)  T(F): 97.7 (16 Jun 2025 04:58), Max: 98.3 (16 Jun 2025 01:27)  HR: 86 (16 Jun 2025 04:58) (78 - 90)  BP: 124/78 (16 Jun 2025 04:58) (117/77 - 155/87)  BP(mean): 94 (16 Jun 2025 04:58) (94 - 94)  RR: 18 (16 Jun 2025 04:58) (16 - 18)  SpO2: 98% (16 Jun 2025 04:58) (98% - 100%)    Parameters below as of 16 Jun 2025 03:52  Patient On (Oxygen Delivery Method): room air      PHYSICAL EXAM-  GENERAL: NAD,   HEAD:  Atraumatic, Normocephalic  EYES: EOMI, PERRLA, conjunctiva and sclera clear  NECK: Supple, No JVD, Normal thyroid  NERVOUS SYSTEM:  Alert & Oriented X3, Motor Strength 5/5 B/L upper and lower extremities; DTRs 2+ intact and symmetric  CHEST/LUNG: Clear to percussion bilaterally; No rales, rhonchi, wheezing, or rubs  HEART: Regular rate and rhythm; No murmurs, rubs, or gallops  ABDOMEN: Soft, Nontender, Nondistended; Bowel sounds present  EXTREMITIES:  2+ Peripheral Pulses, No clubbing, cyanosis, or edema  SKIN: No rashes or lesions

## 2025-06-16 NOTE — DISCHARGE NOTE PROVIDER - CARE PROVIDER_API CALL
Lea Tay  Endocrinology/Metab/Diabetes  [address] [place] [address]  Phone: [phone]  Fax: [phone]  Follow Up Time: 1 week

## 2025-06-16 NOTE — DISCHARGE NOTE NURSING/CASE MANAGEMENT/SOCIAL WORK - FINANCIAL ASSISTANCE
[facility] provides services at a reduced cost to those who are determined to be eligible through [facility]’s financial assistance program. Information regarding [facility]’s financial assistance program can be found by going to [email or web address] or by calling [phone].

## 2025-06-16 NOTE — PROGRESS NOTE ADULT - ASSESSMENT
Ms Phelan is a 65 EDER w a PMH of osteoporosis, dyslipidemia presenting for N/V, diarrhea and abdominal pain after drinking bowel prep for colonoscopy. Patient reports a very similar episode last year when bowel prep consumed. Patient admitted for Severe hyponatremia.    #Severe Hyponatremia  #Hypokalemia  #Hypomagnisemia  #Electrolyte derangement secondary to iatrogenic gastroenteritis   #NBNB Nausea and vomiting (resolved)  #iatrogenic diarhea (after bowel prep)  #abdominal Pain (resolved)   #Ho N/V and diarrhea after Bowel prep  hyponatremia most likely due to iatrogenic gastroenteritis due to bowel prep  -s/p 2L IV NS bolus in ED  -s/p IV NS at 50cc/hr for 10 hr   -Follow up CT scan  -f/u Na and K levels at 11am   -f/u Urine lytes and osmolarity  -Clears  -dc Nephrology consult, renal function at baseline, can reconsult as needed after urine studies   -Avoid sodium overcorrection       #Ho Osteoporosis   patient trialing dietary and lifestyle modifications  discussed out patinet f/u with PCP regarding bisphosphinate therapy     #Ho Mixed HLD  c/w home rosuvastatin         #Misc  - DVT Prophylaxis:  - GI Prophylaxis:  - Diet:  - Activity:  - IV Fluids:  - Code Status:    Dispo:

## 2025-06-16 NOTE — DISCHARGE NOTE PROVIDER - ATTENDING DISCHARGE PHYSICAL EXAMINATION:
VITALS:   T(C): 36.7 (06-16-25 @ 13:46), Max: 36.8 (06-16-25 @ 01:27)  HR: 74 (06-16-25 @ 13:46) (74 - 90)  BP: 121/75 (06-16-25 @ 13:46) (117/77 - 155/87)  RR: 18 (06-16-25 @ 13:46) (16 - 18)  SpO2: 100% (06-16-25 @ 13:46) (98% - 100%)    GENERAL: NAD, lying in bed comfortably  HEAD:  Atraumatic, Normocephalic  EYES: EOMI, conjunctiva and sclera clear  ENT: Moist mucous membranes  NECK: Supple, No JVD  CHEST/LUNG: Clear to auscultation bilaterally; No rales, rhonchi, wheezing, or rubs. Unlabored respirations  HEART: Regular rate and rhythm; No murmurs, rubs, or gallops  ABDOMEN: BSx4; Soft, nontender, nondistended  EXTREMITIES:  2+ Peripheral Pulses, brisk capillary refill. No clubbing, cyanosis, or edema  NERVOUS SYSTEM:  A&Ox3, no focal deficits   SKIN: No rashes or lesions

## 2025-07-08 ENCOUNTER — NON-APPOINTMENT (OUTPATIENT)
Age: 65
End: 2025-07-08

## 2025-07-10 ENCOUNTER — APPOINTMENT (OUTPATIENT)
Dept: ENDOCRINOLOGY | Facility: CLINIC | Age: 65
End: 2025-07-10
Payer: COMMERCIAL

## 2025-07-10 VITALS
DIASTOLIC BLOOD PRESSURE: 78 MMHG | OXYGEN SATURATION: 98 % | SYSTOLIC BLOOD PRESSURE: 124 MMHG | WEIGHT: 110 LBS | HEIGHT: 61 IN | HEART RATE: 80 BPM | BODY MASS INDEX: 20.77 KG/M2

## 2025-07-10 PROCEDURE — 99204 OFFICE O/P NEW MOD 45 MIN: CPT

## 2025-07-10 RX ORDER — ROSUVASTATIN CALCIUM 5 MG/1
5 TABLET, FILM COATED ORAL
Refills: 0 | Status: ACTIVE | COMMUNITY

## 2025-07-10 RX ORDER — UBIDECARENONE/VIT E ACET 100MG-5
CAPSULE ORAL
Refills: 0 | Status: ACTIVE | COMMUNITY

## 2025-07-10 RX ORDER — MULTIVIT-MIN/IRON/FOLIC ACID/K 18-600-40
CAPSULE ORAL
Refills: 0 | Status: ACTIVE | COMMUNITY

## 2025-07-12 RX ORDER — CALCIUM CARBONATE 600 MG
TABLET ORAL
Refills: 0 | Status: ACTIVE | COMMUNITY

## 2025-07-12 RX ORDER — ASPIRIN 81 MG
81 TABLET, DELAYED RELEASE (ENTERIC COATED) ORAL
Refills: 0 | Status: ACTIVE | COMMUNITY